# Patient Record
Sex: MALE | Race: WHITE | NOT HISPANIC OR LATINO | Employment: OTHER | URBAN - METROPOLITAN AREA
[De-identification: names, ages, dates, MRNs, and addresses within clinical notes are randomized per-mention and may not be internally consistent; named-entity substitution may affect disease eponyms.]

---

## 2018-10-08 ENCOUNTER — HOSPITAL ENCOUNTER (OUTPATIENT)
Dept: GENERAL RADIOLOGY | Facility: HOSPITAL | Age: 68
Discharge: HOME | End: 2018-10-08
Admitting: ORTHOPAEDIC SURGERY

## 2018-10-08 ENCOUNTER — APPOINTMENT (OUTPATIENT)
Dept: PREADMISSION TESTING | Facility: HOSPITAL | Age: 68
End: 2018-10-08

## 2018-10-08 LAB
ANION GAP SERPL CALCULATED.3IONS-SCNC: 5 MMOL/L (ref 3–11)
BUN BLD-MCNC: 16 MG/DL (ref 9–23)
BUN/CREAT SERPL: 14.7 (ref 7–25)
CALCIUM SPEC-SCNC: 9.1 MG/DL (ref 8.7–10.4)
CHLORIDE SERPL-SCNC: 108 MMOL/L (ref 99–109)
CO2 SERPL-SCNC: 32 MMOL/L (ref 20–31)
CREAT BLD-MCNC: 1.09 MG/DL (ref 0.6–1.3)
DEPRECATED RDW RBC AUTO: 43.7 FL (ref 37–54)
ERYTHROCYTE [DISTWIDTH] IN BLOOD BY AUTOMATED COUNT: 13.1 % (ref 11.3–14.5)
GFR SERPL CREATININE-BSD FRML MDRD: 68 ML/MIN/1.73
GLUCOSE BLD-MCNC: 64 MG/DL (ref 70–100)
HBA1C MFR BLD: 6.1 % (ref 4.8–5.6)
HCT VFR BLD AUTO: 40.1 % (ref 38.9–50.9)
HGB BLD-MCNC: 13.3 G/DL (ref 13.1–17.5)
MCH RBC QN AUTO: 30.4 PG (ref 27–31)
MCHC RBC AUTO-ENTMCNC: 33.2 G/DL (ref 32–36)
MCV RBC AUTO: 91.6 FL (ref 80–99)
PLATELET # BLD AUTO: 128 10*3/MM3 (ref 150–450)
PMV BLD AUTO: 10.5 FL (ref 6–12)
POTASSIUM BLD-SCNC: 4.1 MMOL/L (ref 3.5–5.5)
RBC # BLD AUTO: 4.38 10*6/MM3 (ref 4.2–5.76)
SODIUM BLD-SCNC: 145 MMOL/L (ref 132–146)
WBC NRBC COR # BLD: 4.39 10*3/MM3 (ref 3.5–10.8)

## 2018-10-08 PROCEDURE — 36415 COLL VENOUS BLD VENIPUNCTURE: CPT

## 2018-10-08 PROCEDURE — 83036 HEMOGLOBIN GLYCOSYLATED A1C: CPT | Performed by: ORTHOPAEDIC SURGERY

## 2018-10-08 PROCEDURE — 93005 ELECTROCARDIOGRAM TRACING: CPT

## 2018-10-08 PROCEDURE — 93010 ELECTROCARDIOGRAM REPORT: CPT | Performed by: INTERNAL MEDICINE

## 2018-10-08 PROCEDURE — 80048 BASIC METABOLIC PNL TOTAL CA: CPT | Performed by: ORTHOPAEDIC SURGERY

## 2018-10-08 PROCEDURE — 71046 X-RAY EXAM CHEST 2 VIEWS: CPT

## 2018-10-08 PROCEDURE — 85027 COMPLETE CBC AUTOMATED: CPT | Performed by: ORTHOPAEDIC SURGERY

## 2018-10-08 RX ORDER — MELOXICAM 15 MG/1
15 TABLET ORAL NIGHTLY
COMMUNITY

## 2018-10-08 RX ORDER — PANTOPRAZOLE SODIUM 20 MG/1
20 TABLET, DELAYED RELEASE ORAL NIGHTLY
COMMUNITY

## 2018-10-08 RX ORDER — ATORVASTATIN CALCIUM 10 MG/1
10 TABLET, FILM COATED ORAL NIGHTLY
COMMUNITY

## 2018-10-08 RX ORDER — CLOPIDOGREL BISULFATE 75 MG/1
75 TABLET ORAL NIGHTLY
COMMUNITY

## 2018-10-08 RX ORDER — UBIDECARENONE 100 MG
100 CAPSULE ORAL NIGHTLY
COMMUNITY

## 2018-10-08 RX ORDER — METOPROLOL SUCCINATE 50 MG/1
50 TABLET, EXTENDED RELEASE ORAL NIGHTLY
COMMUNITY

## 2018-10-08 NOTE — PAT
Home monitoring for pacemaker     Patient instructed to drink 20 ounces (or until full) of Gatorade or 20 ounces of G2 (if diabetic) and complete 3 hours before your surgery start time. (NO RED Gatorade or G2)    Patient verbalized understanding.    Patient to apply Chlorhexadine wipes  to surgical area (as instructed) the night before procedure and the AM of procedure. Wipes provided.

## 2018-10-08 NOTE — DISCHARGE INSTRUCTIONS

## 2018-10-14 ENCOUNTER — ANESTHESIA EVENT (OUTPATIENT)
Dept: PERIOP | Facility: HOSPITAL | Age: 68
End: 2018-10-14

## 2018-10-14 RX ORDER — FAMOTIDINE 10 MG/ML
20 INJECTION, SOLUTION INTRAVENOUS ONCE
Status: CANCELLED | OUTPATIENT
Start: 2018-10-14 | End: 2018-10-14

## 2018-10-15 ENCOUNTER — ANESTHESIA (OUTPATIENT)
Dept: PERIOP | Facility: HOSPITAL | Age: 68
End: 2018-10-15

## 2018-10-15 ENCOUNTER — HOSPITAL ENCOUNTER (INPATIENT)
Facility: HOSPITAL | Age: 68
LOS: 1 days | Discharge: HOME | End: 2018-10-16
Attending: ORTHOPAEDIC SURGERY | Admitting: ORTHOPAEDIC SURGERY

## 2018-10-15 ENCOUNTER — APPOINTMENT (OUTPATIENT)
Dept: GENERAL RADIOLOGY | Facility: HOSPITAL | Age: 68
End: 2018-10-15

## 2018-10-15 PROBLEM — R73.03 PREDIABETES: Status: ACTIVE | Noted: 2018-10-15

## 2018-10-15 PROBLEM — Z86.73 HISTORY OF STROKE: Status: ACTIVE | Noted: 2018-10-15

## 2018-10-15 PROBLEM — I10 HTN (HYPERTENSION): Status: ACTIVE | Noted: 2018-10-15

## 2018-10-15 PROBLEM — Z96.612 STATUS POST REVERSE TOTAL REPLACEMENT OF LEFT SHOULDER: Status: ACTIVE | Noted: 2018-10-15

## 2018-10-15 PROBLEM — E78.5 HLD (HYPERLIPIDEMIA): Status: ACTIVE | Noted: 2018-10-15

## 2018-10-15 PROBLEM — Z95.0 PACEMAKER: Status: ACTIVE | Noted: 2018-10-15

## 2018-10-15 LAB
GLUCOSE BLDC GLUCOMTR-MCNC: 122 MG/DL (ref 70–130)
GLUCOSE BLDC GLUCOMTR-MCNC: 153 MG/DL (ref 70–130)
POTASSIUM BLDA-SCNC: 3.55 MMOL/L (ref 3.5–5.3)

## 2018-10-15 PROCEDURE — 0LS40ZZ REPOSITION LEFT UPPER ARM TENDON, OPEN APPROACH: ICD-10-PCS | Performed by: ORTHOPAEDIC SURGERY

## 2018-10-15 PROCEDURE — 84132 ASSAY OF SERUM POTASSIUM: CPT | Performed by: ANESTHESIOLOGY

## 2018-10-15 PROCEDURE — 25010000002 PHENYLEPHRINE PER 1 ML: Performed by: NURSE ANESTHETIST, CERTIFIED REGISTERED

## 2018-10-15 PROCEDURE — A9270 NON-COVERED ITEM OR SERVICE: HCPCS | Performed by: NURSE PRACTITIONER

## 2018-10-15 PROCEDURE — G8987 SELF CARE CURRENT STATUS: HCPCS | Performed by: OCCUPATIONAL THERAPIST

## 2018-10-15 PROCEDURE — C1776 JOINT DEVICE (IMPLANTABLE): HCPCS | Performed by: ORTHOPAEDIC SURGERY

## 2018-10-15 PROCEDURE — 63710000001 SERTRALINE 50 MG TABLET: Performed by: NURSE PRACTITIONER

## 2018-10-15 PROCEDURE — 63710000001 ATORVASTATIN 10 MG TABLET: Performed by: NURSE PRACTITIONER

## 2018-10-15 PROCEDURE — 25010000002 DEXAMETHASONE PER 1 MG: Performed by: NURSE ANESTHETIST, CERTIFIED REGISTERED

## 2018-10-15 PROCEDURE — 25010000002 FENTANYL CITRATE (PF) 100 MCG/2ML SOLUTION: Performed by: NURSE ANESTHETIST, CERTIFIED REGISTERED

## 2018-10-15 PROCEDURE — 0RRK00Z REPLACEMENT OF LEFT SHOULDER JOINT WITH REVERSE BALL AND SOCKET SYNTHETIC SUBSTITUTE, OPEN APPROACH: ICD-10-PCS | Performed by: ORTHOPAEDIC SURGERY

## 2018-10-15 PROCEDURE — 25010000003 CEFAZOLIN IN DEXTROSE 2-4 GM/100ML-% SOLUTION: Performed by: ORTHOPAEDIC SURGERY

## 2018-10-15 PROCEDURE — 25010000002 VANCOMYCIN: Performed by: ORTHOPAEDIC SURGERY

## 2018-10-15 PROCEDURE — 97535 SELF CARE MNGMENT TRAINING: CPT | Performed by: OCCUPATIONAL THERAPIST

## 2018-10-15 PROCEDURE — 25010000002 ROPIVACAINE PER 1 MG: Performed by: NURSE ANESTHETIST, CERTIFIED REGISTERED

## 2018-10-15 PROCEDURE — 97110 THERAPEUTIC EXERCISES: CPT | Performed by: OCCUPATIONAL THERAPIST

## 2018-10-15 PROCEDURE — 25010000002 PROPOFOL 1000 MG/ML EMULSION: Performed by: NURSE ANESTHETIST, CERTIFIED REGISTERED

## 2018-10-15 PROCEDURE — 25010000002 ROPIVACINE HCL-NACL: Performed by: NURSE ANESTHETIST, CERTIFIED REGISTERED

## 2018-10-15 PROCEDURE — 63710000001 METOPROLOL SUCCINATE XL 50 MG TABLET SUSTAINED-RELEASE 24 HOUR: Performed by: NURSE PRACTITIONER

## 2018-10-15 PROCEDURE — 25010000002 NEOSTIGMINE PER 0.5 MG: Performed by: NURSE ANESTHETIST, CERTIFIED REGISTERED

## 2018-10-15 PROCEDURE — 97166 OT EVAL MOD COMPLEX 45 MIN: CPT | Performed by: OCCUPATIONAL THERAPIST

## 2018-10-15 PROCEDURE — 25010000002 VANCOMYCIN 10 G RECONSTITUTED SOLUTION: Performed by: ORTHOPAEDIC SURGERY

## 2018-10-15 PROCEDURE — G8988 SELF CARE GOAL STATUS: HCPCS | Performed by: OCCUPATIONAL THERAPIST

## 2018-10-15 PROCEDURE — 25010000002 ONDANSETRON PER 1 MG: Performed by: NURSE ANESTHETIST, CERTIFIED REGISTERED

## 2018-10-15 PROCEDURE — 82962 GLUCOSE BLOOD TEST: CPT

## 2018-10-15 PROCEDURE — 73030 X-RAY EXAM OF SHOULDER: CPT

## 2018-10-15 PROCEDURE — 25010000002 PROPOFOL 10 MG/ML EMULSION: Performed by: NURSE ANESTHETIST, CERTIFIED REGISTERED

## 2018-10-15 PROCEDURE — 63710000001 INSULIN LISPRO (HUMAN) PER 5 UNITS: Performed by: NURSE PRACTITIONER

## 2018-10-15 DEVICE — SCRW COMPR EQUINOXE LK 4.5X26MM: Type: IMPLANTABLE DEVICE | Site: SHOULDER | Status: FUNCTIONAL

## 2018-10-15 DEVICE — SCRW LK EQUINOXE GLENOSPHERE REV/SHLDR: Type: IMPLANTABLE DEVICE | Site: SHOULDER | Status: FUNCTIONAL

## 2018-10-15 DEVICE — SCRW EQUINOXE TORQ DEFINE REV SHLDR KT: Type: IMPLANTABLE DEVICE | Site: SHOULDER | Status: FUNCTIONAL

## 2018-10-15 DEVICE — IMPLANTABLE DEVICE: Type: IMPLANTABLE DEVICE | Site: SHOULDER | Status: FUNCTIONAL

## 2018-10-15 DEVICE — TOTL SHLDR AUG PLT ARTHROPLASTY UPCHRG: Type: IMPLANTABLE DEVICE | Site: SHOULDER | Status: FUNCTIONAL

## 2018-10-15 DEVICE — LINER HUM EQUINOXE REV SHLDR 38 PLS0: Type: IMPLANTABLE DEVICE | Site: SHOULDER | Status: FUNCTIONAL

## 2018-10-15 DEVICE — GLENOSPHERE SHLDR/REV EQUINOXE 38MM: Type: IMPLANTABLE DEVICE | Site: SHOULDER | Status: FUNCTIONAL

## 2018-10-15 DEVICE — TRY HUM EQUINOXE ADPT REV SHLDR PLS0: Type: IMPLANTABLE DEVICE | Site: SHOULDER | Status: FUNCTIONAL

## 2018-10-15 DEVICE — SCRW COMPR EQUINOXE LK 4.5X38MM: Type: IMPLANTABLE DEVICE | Site: SHOULDER | Status: FUNCTIONAL

## 2018-10-15 DEVICE — PLT GLEN JNT EQUINOXE AUG POST 8DEG LT: Type: IMPLANTABLE DEVICE | Site: SHOULDER | Status: FUNCTIONAL

## 2018-10-15 DEVICE — STEM HUM PRI EQUINOXE PRESSFIT 9MM: Type: IMPLANTABLE DEVICE | Site: SHOULDER | Status: FUNCTIONAL

## 2018-10-15 DEVICE — SCRW COMPR EQUINOXE LK 4.5X34MM: Type: IMPLANTABLE DEVICE | Site: SHOULDER | Status: FUNCTIONAL

## 2018-10-15 RX ORDER — ATORVASTATIN CALCIUM 10 MG/1
10 TABLET, FILM COATED ORAL NIGHTLY
Status: DISCONTINUED | OUTPATIENT
Start: 2018-10-15 | End: 2018-10-16 | Stop reason: HOSPADM

## 2018-10-15 RX ORDER — NALOXONE HCL 0.4 MG/ML
0.1 VIAL (ML) INJECTION
Status: DISCONTINUED | OUTPATIENT
Start: 2018-10-15 | End: 2018-10-16 | Stop reason: HOSPADM

## 2018-10-15 RX ORDER — FAMOTIDINE 20 MG/1
20 TABLET, FILM COATED ORAL ONCE
Status: COMPLETED | OUTPATIENT
Start: 2018-10-15 | End: 2018-10-15

## 2018-10-15 RX ORDER — PROPOFOL 10 MG/ML
VIAL (ML) INTRAVENOUS AS NEEDED
Status: DISCONTINUED | OUTPATIENT
Start: 2018-10-15 | End: 2018-10-15 | Stop reason: SURG

## 2018-10-15 RX ORDER — LIDOCAINE HYDROCHLORIDE 10 MG/ML
0.5 INJECTION, SOLUTION EPIDURAL; INFILTRATION; INTRACAUDAL; PERINEURAL ONCE AS NEEDED
Status: COMPLETED | OUTPATIENT
Start: 2018-10-15 | End: 2018-10-15

## 2018-10-15 RX ORDER — ONDANSETRON 2 MG/ML
INJECTION INTRAMUSCULAR; INTRAVENOUS AS NEEDED
Status: DISCONTINUED | OUTPATIENT
Start: 2018-10-15 | End: 2018-10-15 | Stop reason: SURG

## 2018-10-15 RX ORDER — SODIUM CHLORIDE, SODIUM LACTATE, POTASSIUM CHLORIDE, CALCIUM CHLORIDE 600; 310; 30; 20 MG/100ML; MG/100ML; MG/100ML; MG/100ML
9 INJECTION, SOLUTION INTRAVENOUS CONTINUOUS
Status: DISCONTINUED | OUTPATIENT
Start: 2018-10-15 | End: 2018-10-16 | Stop reason: HOSPADM

## 2018-10-15 RX ORDER — ONDANSETRON 2 MG/ML
4 INJECTION INTRAMUSCULAR; INTRAVENOUS EVERY 6 HOURS PRN
Status: DISCONTINUED | OUTPATIENT
Start: 2018-10-15 | End: 2018-10-16 | Stop reason: HOSPADM

## 2018-10-15 RX ORDER — CEFAZOLIN SODIUM 2 G/100ML
2 INJECTION, SOLUTION INTRAVENOUS ONCE
Status: COMPLETED | OUTPATIENT
Start: 2018-10-15 | End: 2018-10-15

## 2018-10-15 RX ORDER — DEXAMETHASONE SODIUM PHOSPHATE 4 MG/ML
INJECTION, SOLUTION INTRA-ARTICULAR; INTRALESIONAL; INTRAMUSCULAR; INTRAVENOUS; SOFT TISSUE AS NEEDED
Status: DISCONTINUED | OUTPATIENT
Start: 2018-10-15 | End: 2018-10-15 | Stop reason: SURG

## 2018-10-15 RX ORDER — ROCURONIUM BROMIDE 10 MG/ML
INJECTION, SOLUTION INTRAVENOUS AS NEEDED
Status: DISCONTINUED | OUTPATIENT
Start: 2018-10-15 | End: 2018-10-15 | Stop reason: SURG

## 2018-10-15 RX ORDER — DOCUSATE SODIUM 100 MG/1
100 CAPSULE, LIQUID FILLED ORAL 2 TIMES DAILY PRN
Status: DISCONTINUED | OUTPATIENT
Start: 2018-10-15 | End: 2018-10-16 | Stop reason: HOSPADM

## 2018-10-15 RX ORDER — SODIUM CHLORIDE 0.9 % (FLUSH) 0.9 %
3 SYRINGE (ML) INJECTION EVERY 12 HOURS SCHEDULED
Status: DISCONTINUED | OUTPATIENT
Start: 2018-10-15 | End: 2018-10-15 | Stop reason: HOSPADM

## 2018-10-15 RX ORDER — FENTANYL CITRATE 50 UG/ML
50 INJECTION, SOLUTION INTRAMUSCULAR; INTRAVENOUS
Status: DISCONTINUED | OUTPATIENT
Start: 2018-10-15 | End: 2018-10-15 | Stop reason: HOSPADM

## 2018-10-15 RX ORDER — LIDOCAINE HYDROCHLORIDE 10 MG/ML
INJECTION, SOLUTION INFILTRATION; PERINEURAL AS NEEDED
Status: DISCONTINUED | OUTPATIENT
Start: 2018-10-15 | End: 2018-10-15 | Stop reason: SURG

## 2018-10-15 RX ORDER — FENTANYL CITRATE 50 UG/ML
INJECTION, SOLUTION INTRAMUSCULAR; INTRAVENOUS AS NEEDED
Status: DISCONTINUED | OUTPATIENT
Start: 2018-10-15 | End: 2018-10-15 | Stop reason: SURG

## 2018-10-15 RX ORDER — LABETALOL HYDROCHLORIDE 5 MG/ML
10 INJECTION, SOLUTION INTRAVENOUS EVERY 4 HOURS PRN
Status: DISCONTINUED | OUTPATIENT
Start: 2018-10-15 | End: 2018-10-16 | Stop reason: HOSPADM

## 2018-10-15 RX ORDER — BISACODYL 5 MG/1
10 TABLET, DELAYED RELEASE ORAL DAILY PRN
Status: DISCONTINUED | OUTPATIENT
Start: 2018-10-15 | End: 2018-10-16 | Stop reason: HOSPADM

## 2018-10-15 RX ORDER — GLYCOPYRROLATE 0.2 MG/ML
INJECTION INTRAMUSCULAR; INTRAVENOUS AS NEEDED
Status: DISCONTINUED | OUTPATIENT
Start: 2018-10-15 | End: 2018-10-15 | Stop reason: SURG

## 2018-10-15 RX ORDER — HYDROCODONE BITARTRATE AND ACETAMINOPHEN 10; 325 MG/1; MG/1
1 TABLET ORAL EVERY 4 HOURS PRN
Status: DISCONTINUED | OUTPATIENT
Start: 2018-10-15 | End: 2018-10-16 | Stop reason: HOSPADM

## 2018-10-15 RX ORDER — BUPIVACAINE HYDROCHLORIDE 2.5 MG/ML
INJECTION, SOLUTION EPIDURAL; INFILTRATION; INTRACAUDAL AS NEEDED
Status: DISCONTINUED | OUTPATIENT
Start: 2018-10-15 | End: 2018-10-15 | Stop reason: SURG

## 2018-10-15 RX ORDER — HYDROMORPHONE HYDROCHLORIDE 1 MG/ML
0.5 INJECTION, SOLUTION INTRAMUSCULAR; INTRAVENOUS; SUBCUTANEOUS
Status: DISCONTINUED | OUTPATIENT
Start: 2018-10-15 | End: 2018-10-16 | Stop reason: HOSPADM

## 2018-10-15 RX ORDER — METOPROLOL SUCCINATE 50 MG/1
50 TABLET, EXTENDED RELEASE ORAL NIGHTLY
Status: DISCONTINUED | OUTPATIENT
Start: 2018-10-15 | End: 2018-10-16 | Stop reason: HOSPADM

## 2018-10-15 RX ORDER — DEXTROSE MONOHYDRATE 25 G/50ML
25 INJECTION, SOLUTION INTRAVENOUS
Status: DISCONTINUED | OUTPATIENT
Start: 2018-10-15 | End: 2018-10-16 | Stop reason: HOSPADM

## 2018-10-15 RX ORDER — ONDANSETRON 2 MG/ML
4 INJECTION INTRAMUSCULAR; INTRAVENOUS ONCE AS NEEDED
Status: DISCONTINUED | OUTPATIENT
Start: 2018-10-15 | End: 2018-10-15 | Stop reason: HOSPADM

## 2018-10-15 RX ORDER — NICOTINE POLACRILEX 4 MG
15 LOZENGE BUCCAL
Status: DISCONTINUED | OUTPATIENT
Start: 2018-10-15 | End: 2018-10-16 | Stop reason: HOSPADM

## 2018-10-15 RX ORDER — LIDOCAINE HYDROCHLORIDE 10 MG/ML
INJECTION, SOLUTION EPIDURAL; INFILTRATION; INTRACAUDAL; PERINEURAL AS NEEDED
Status: DISCONTINUED | OUTPATIENT
Start: 2018-10-15 | End: 2018-10-15 | Stop reason: SURG

## 2018-10-15 RX ORDER — SODIUM CHLORIDE 0.9 % (FLUSH) 0.9 %
3-10 SYRINGE (ML) INJECTION AS NEEDED
Status: DISCONTINUED | OUTPATIENT
Start: 2018-10-15 | End: 2018-10-15 | Stop reason: HOSPADM

## 2018-10-15 RX ORDER — HYDROMORPHONE HYDROCHLORIDE 1 MG/ML
0.5 INJECTION, SOLUTION INTRAMUSCULAR; INTRAVENOUS; SUBCUTANEOUS
Status: DISCONTINUED | OUTPATIENT
Start: 2018-10-15 | End: 2018-10-15 | Stop reason: HOSPADM

## 2018-10-15 RX ORDER — SODIUM CHLORIDE 450 MG/100ML
50 INJECTION, SOLUTION INTRAVENOUS CONTINUOUS
Status: DISCONTINUED | OUTPATIENT
Start: 2018-10-15 | End: 2018-10-16 | Stop reason: HOSPADM

## 2018-10-15 RX ORDER — PANTOPRAZOLE SODIUM 20 MG/1
20 TABLET, DELAYED RELEASE ORAL NIGHTLY
Status: DISCONTINUED | OUTPATIENT
Start: 2018-10-15 | End: 2018-10-16 | Stop reason: HOSPADM

## 2018-10-15 RX ADMIN — CEFAZOLIN SODIUM 2 G: 2 INJECTION, SOLUTION INTRAVENOUS at 09:44

## 2018-10-15 RX ADMIN — SERTRALINE HYDROCHLORIDE 50 MG: 50 TABLET ORAL at 20:52

## 2018-10-15 RX ADMIN — ROPIVACAINE HYDROCHLORIDE 6 ML/HR: 5 INJECTION, SOLUTION EPIDURAL; INFILTRATION; PERINEURAL at 12:15

## 2018-10-15 RX ADMIN — PROPOFOL 155 MCG/KG/MIN: 10 INJECTION, EMULSION INTRAVENOUS at 10:15

## 2018-10-15 RX ADMIN — VANCOMYCIN HYDROCHLORIDE 1250 MG: 10 INJECTION, POWDER, LYOPHILIZED, FOR SOLUTION INTRAVENOUS at 20:52

## 2018-10-15 RX ADMIN — PHENYLEPHRINE HYDROCHLORIDE 80 MCG: 10 INJECTION INTRAVENOUS at 10:48

## 2018-10-15 RX ADMIN — DEXAMETHASONE SODIUM PHOSPHATE 8 MG: 4 INJECTION, SOLUTION INTRAMUSCULAR; INTRAVENOUS at 10:25

## 2018-10-15 RX ADMIN — PROPOFOL 150 MG: 10 INJECTION, EMULSION INTRAVENOUS at 10:08

## 2018-10-15 RX ADMIN — ROCURONIUM BROMIDE 50 MG: 10 SOLUTION INTRAVENOUS at 10:08

## 2018-10-15 RX ADMIN — VANCOMYCIN HYDROCHLORIDE 1250 MG: 10 INJECTION, POWDER, LYOPHILIZED, FOR SOLUTION INTRAVENOUS at 10:05

## 2018-10-15 RX ADMIN — FENTANYL CITRATE 100 MCG: 50 INJECTION, SOLUTION INTRAMUSCULAR; INTRAVENOUS at 09:26

## 2018-10-15 RX ADMIN — FENTANYL CITRATE 50 MCG: 50 INJECTION, SOLUTION INTRAMUSCULAR; INTRAVENOUS at 13:15

## 2018-10-15 RX ADMIN — PHENYLEPHRINE HYDROCHLORIDE 80 MCG: 10 INJECTION INTRAVENOUS at 11:47

## 2018-10-15 RX ADMIN — PHENYLEPHRINE HYDROCHLORIDE 80 MCG: 10 INJECTION INTRAVENOUS at 10:32

## 2018-10-15 RX ADMIN — BUPIVACAINE HYDROCHLORIDE 15 ML: 2.5 INJECTION, SOLUTION EPIDURAL; INFILTRATION; INTRACAUDAL; PERINEURAL at 09:30

## 2018-10-15 RX ADMIN — VANCOMYCIN HYDROCHLORIDE 1250 MG: 10 INJECTION, POWDER, LYOPHILIZED, FOR SOLUTION INTRAVENOUS at 10:19

## 2018-10-15 RX ADMIN — SODIUM CHLORIDE 50 ML/HR: 4.5 INJECTION, SOLUTION INTRAVENOUS at 15:00

## 2018-10-15 RX ADMIN — LIDOCAINE HYDROCHLORIDE 30 MG: 10 INJECTION, SOLUTION EPIDURAL; INFILTRATION; INTRACAUDAL; PERINEURAL at 09:27

## 2018-10-15 RX ADMIN — PHENYLEPHRINE HYDROCHLORIDE 80 MCG: 10 INJECTION INTRAVENOUS at 11:26

## 2018-10-15 RX ADMIN — ROPIVACAINE HYDROCHLORIDE 6 ML/HR: 5 INJECTION, SOLUTION EPIDURAL; INFILTRATION; PERINEURAL at 12:41

## 2018-10-15 RX ADMIN — SODIUM CHLORIDE, POTASSIUM CHLORIDE, SODIUM LACTATE AND CALCIUM CHLORIDE 9 ML/HR: 600; 310; 30; 20 INJECTION, SOLUTION INTRAVENOUS at 08:14

## 2018-10-15 RX ADMIN — SODIUM CHLORIDE, POTASSIUM CHLORIDE, SODIUM LACTATE AND CALCIUM CHLORIDE: 600; 310; 30; 20 INJECTION, SOLUTION INTRAVENOUS at 11:27

## 2018-10-15 RX ADMIN — FAMOTIDINE 20 MG: 20 TABLET ORAL at 08:13

## 2018-10-15 RX ADMIN — GLYCOPYRROLATE 0.4 MG: 0.2 INJECTION, SOLUTION INTRAMUSCULAR; INTRAVENOUS at 12:20

## 2018-10-15 RX ADMIN — METOPROLOL SUCCINATE 50 MG: 50 TABLET, EXTENDED RELEASE ORAL at 20:52

## 2018-10-15 RX ADMIN — LIDOCAINE HYDROCHLORIDE 0.3 ML: 10 INJECTION, SOLUTION EPIDURAL; INFILTRATION; INTRACAUDAL; PERINEURAL at 08:13

## 2018-10-15 RX ADMIN — LIDOCAINE HYDROCHLORIDE 40 MG: 10 INJECTION, SOLUTION INFILTRATION; PERINEURAL at 10:08

## 2018-10-15 RX ADMIN — Medication 2 MG: at 12:20

## 2018-10-15 RX ADMIN — ONDANSETRON 4 MG: 2 INJECTION INTRAMUSCULAR; INTRAVENOUS at 12:20

## 2018-10-15 RX ADMIN — ATORVASTATIN CALCIUM 10 MG: 10 TABLET, FILM COATED ORAL at 20:52

## 2018-10-15 NOTE — ANESTHESIA PROCEDURE NOTES
Airway  Urgency: elective    Airway not difficult    General Information and Staff    Patient location during procedure: OR  CRNA: ZOË MEDRANO    Indications and Patient Condition  Indications for airway management: airway protection    Preoxygenated: yes  MILS not maintained throughout  Mask difficulty assessment: 1 - vent by mask    Final Airway Details  Final airway type: endotracheal airway      Successful airway: ETT  Cuffed: yes   Successful intubation technique: direct laryngoscopy  Endotracheal tube insertion site: oral  Blade: [place]  Blade size: 2  ETT size: 7.5 mm  Cormack-Lehane Classification: grade I - full view of glottis  Placement verified by: chest auscultation and capnometry   Measured from: lips  ETT to lips (cm): 23  Number of attempts at approach: 1    Additional Comments  Negative epigastric sounds, Breath sound equal bilaterally with symmetric chest rise and fall

## 2018-10-15 NOTE — ANESTHESIA POSTPROCEDURE EVALUATION
Patient: Levi Cain    Procedure Summary     Date:  10/15/18 Room / Location:   [facility] /  [facility]    Anesthesia Start:  1008 Anesthesia Stop:  1244    Procedure:  LEFT REVERSE TOTAL SHOULDER ARTHROPLASTY (Left Shoulder) Diagnosis:       Primary localized osteoarthrosis of shoulder, left      Bicipital tendinitis of left shoulder    Surgeon:  Lloyd Amador MD Provider:  Gerald Velazquez MD    Anesthesia Type:  general, regional ASA Status:  3          Anesthesia Type: general, regional  Last vitals  BP   131/70 (10/15/18 1240)   Temp   98 °F (36.7 °C) (10/15/18 1240)   Pulse   69 (10/15/18 1240)   Resp   16 (10/15/18 1240)     SpO2   95 % (10/15/18 1240)     Post Anesthesia Care and Evaluation    Patient location during evaluation: PACU  Patient participation: waiting for patient participation  Level of consciousness: awake  Pain score: 0  Pain management: adequate  Airway patency: patent  Anesthetic complications: No anesthetic complications  PONV Status: none  Cardiovascular status: hemodynamically stable and acceptable  Respiratory status: nonlabored ventilation, acceptable, nasal cannula and oral airway  Hydration status: acceptable

## 2018-10-15 NOTE — H&P
Patient Name: Levi Cain  MRN: [id number]  : 1950  DOS: 10/15/2018    Attending: Lloyd Amador MD    Primary Care Provider: John Meyer MD      Chief complaint:  Left shoulder pain    Subjective   Patient is a 68 y.o. male presented for Left reverse total shoulder arthroplasty by Dr. Amador under [place]. He tolerated surgery well and is admitted for further medical management. His shoulder has been painful with limited ROM for several years. He has had previous right shoulder replacement.    When seen postop he is sleeping soundly; wife and son at bedside. He appears comfortable.     He has history of CVA. He takes Plavix daily, LD Monday.    ( above is noted/agree. Still asleep when I visited. Discussed with his wife and son , he has the same response to sedation and anesthesia, with prolonged effects of sedation. No n/vom/sob. Comfortable. He was awake with increased pain, block infusion rate was increased. .)wy       Allergies:  Allergies   Allergen Reactions   • Flexeril [Cyclobenzaprine] Shortness Of Breath   • Nyquil Multi-Symptom [Pseudoeph-Doxylamine-Dm-Apap] Other (See Comments)     Lost consciousness        Meds:  Prescriptions Prior to Admission   Medication Sig Dispense Refill Last Dose   • atorvastatin (LIPITOR) 10 MG tablet Take 10 mg by mouth Every Night.   10/14/2018 at 0   • coenzyme Q10 100 MG capsule Take 100 mg by mouth Every Night.   10/14/2018 at 0   • metoprolol succinate XL (TOPROL-XL) 50 MG 24 hr tablet Take 50 mg by mouth Every Night.   10/14/2018 at    • pantoprazole (PROTONIX) 20 MG EC tablet Take 20 mg by mouth Every Night.   10/14/2018 at    • sertraline (ZOLOFT) 50 MG tablet Take 50 mg by mouth Every Night.   10/14/2018 at    • clopidogrel (PLAVIX) 75 MG tablet Take 75 mg by mouth Every Night. Will stop for surgery per Dr Amador's office   10/7/2018   • meloxicam (MOBIC) 15 MG tablet Take 15 mg by mouth Every Night. Stopped for surgery   10/4/2018  "  • Multiple Vitamins-Minerals (MULTIVITAMIN ADULT PO) Take 1 tablet by mouth Every Night.   10/13/2018   • vitamin E 100 UNIT capsule Take 100 Units by mouth Every Night.   10/7/2018       History:   Past Medical History:   Diagnosis Date   • Arthritis     left shoulder, knees, multi joint    • Cancer (CMS/HCC)     prostate cancer-monitoring levels currently-no current treatment    • Hearing loss    • History of CVA (cerebrovascular accident)    • History of depression    • History of kidney stones     passed   • History of transfusion 1953    as a baby   • Hypertension    • Mitral regurgitation    • Pacemaker     bradycardia/tachycardia    • Peripheral vision loss     right side   • Pott's disease    • Shortness of breath    • Shoulder pain    • Slow to wake up after anesthesia     per pt    • Wears dentures     full   • Wears glasses    • Wears hearing aid in both ears      Past Surgical History:   Procedure Laterality Date   • COLONOSCOPY     • EYE SURGERY Right     lens implant-due to injury from a piece of metal    • HERNIA REPAIR      inguinal hernia as well as umbilical    • REPLACEMENT TOTAL KNEE Left    • TOTAL SHOULDER REPLACEMENT Right    • TOTAL SHOULDER REVERSE ARTHROPLASTY Right      History reviewed. No pertinent family history.  Social History   Substance Use Topics   • Smoking status: Never Smoker   • Smokeless tobacco: Never Used   • Alcohol use No   He is  with 2 children. He is a retired .    Review of Systems  Review of systems could not be obtained due to   patient sedation status.    Vital Signs  /74 (BP Location: Right arm, Patient Position: Lying)   Pulse 69   Temp 96.3 °F (35.7 °C) (Axillary)   Resp 16   Ht 175.3 cm (69\")   Wt 85.3 kg (188 lb)   SpO2 98%   BMI 27.76 kg/m²     Physical Exam:    General Appearance:    Sleeping, in no acute distress   Head:    Normocephalic, without obvious abnormality, atraumatic   Ears:    Ears appear intact with no abnormalities " noted   Neck:   No adenopathy, supple, trachea midline, no thyromegaly    Lungs:     Clear to auscultation,respirations regular, even and                   unlabored    Heart:    Regular rhythm and normal rate, normal S1 and S2, no            murmur, no gallop, no rub    Abdomen:     Normal bowel sounds, no masses, no organomegaly, soft        non-tender, non-distended, no guarding, no rebound                 tenderness   Genitalia:    Deferred   Extremities:   LUE sling, nerve block present. Aquacel CDI. Good cap refill left digits.   Pulses:   Pulses palpable and equal bilaterally   Skin:   No bleeding, bruising or rash      I reviewed the patient's new clinical results.     Results for SALOME WILLS (MRN [id number]) as of 10/15/2018 15:08   Ref. Range 10/8/2018 14:09   Glucose Latest Ref Range: 70 - 100 mg/dL 64 (L)   Sodium Latest Ref Range: 132 - 146 mmol/L 145   Potassium Latest Ref Range: 3.5 - 5.5 mmol/L 4.1   CO2 Latest Ref Range: 20.0 - 31.0 mmol/L 32.0 (H)   Chloride Latest Ref Range: 99 - 109 mmol/L 108   Anion Gap Latest Ref Range: 3.0 - 11.0 mmol/L 5.0   Creatinine Latest Ref Range: 0.60 - 1.30 mg/dL 1.09   BUN Latest Ref Range: 9 - 23 mg/dL 16   BUN/Creatinine Ratio Latest Ref Range: 7.0 - 25.0  14.7   Calcium Latest Ref Range: 8.7 - 10.4 mg/dL 9.1   eGFR Non African Amer Latest Ref Range: >60 mL/min/1.73 68   Hemoglobin A1C Latest Ref Range: 4.80 - 5.60 % 6.10 (H)   WBC Latest Ref Range: 3.50 - 10.80 10*3/mm3 4.39   RBC Latest Ref Range: 4.20 - 5.76 10*6/mm3 4.38   Hemoglobin Latest Ref Range: 13.1 - 17.5 g/dL 13.3   Hematocrit Latest Ref Range: 38.9 - 50.9 % 40.1   RDW Latest Ref Range: 11.3 - 14.5 % 13.1   MCV Latest Ref Range: 80.0 - 99.0 fL 91.6   MCH Latest Ref Range: 27.0 - 31.0 pg 30.4   MCHC Latest Ref Range: 32.0 - 36.0 g/dL 33.2   MPV Latest Ref Range: 6.0 - 12.0 fL 10.5   Platelets Latest Ref Range: 150 - 450 10*3/mm3 128 (L)     Assessment and Plan:     Status post reverse total  replacement of left shoulder    HLD (hyperlipidemia)    HTN (hypertension)    Pacemaker    History of stroke    Prediabetes      Plan  1. PT/OT- LUE sling, pendulum exercises  2. Pain control-prns, interscalene nerve block  3. IS-encourage  4. DVT proph- [facility]  5. Bowel regimen  6. Resume home medications as appropriate  7. Monitor post-op labs  8. DC planning for home, likely tomorrow    HTN, HLD  - Continue home toprol and statin  - Monitor BP   - Holding parameters for BP meds  - Labetalol PRN for SBP>170    HO strokes  - Resume plavix when ok with Dr. Amador    PreDM  - hgb A1c on 10/8/18 6.1  - Accuchecks ACHS with low dose SSI    I have personally performed the evaluation on this patient. My history is consistent  with HPI obtained. My exam findings are listed above. I have personally reviewed and discussed the above formulated treatment plan with with family and with EMILY Moreno  10/15/18  3:32 PM

## 2018-10-15 NOTE — BRIEF OP NOTE
TOTAL SHOULDER REVERSE ARTHROPLASTY  Progress Note    Levi Cain  10/15/2018    Pre-op Diagnosis:   Primary localized osteoarthrosis of shoulder, left [M19.012]       Post-Op Diagnosis Codes:     * Primary localized osteoarthrosis of shoulder, left [M19.012]     * Bicipital tendinitis of left shoulder [M75.22]    Procedure/CPT® Codes:  CT RECONSTR TOTAL SHOULDER IMPLANT [99798]  CT REPAIR BICEPS LONG TENDON [58078]    Procedure(s):  LEFT REVERSE TOTAL SHOULDER ARTHROPLASTY    Surgeon(s):  Kwaku Jc MD Sajadi, MD Vilma Lopez Matt, MD, PGY-6 Sports Fellow    Anesthesia: General with Block    Staff:   Circulator: Magdi Rodriguez RN; Gracie Ng RN  Scrub Person: Pramod Asher Desiree N  Vendor Representative: Barry Jensen Jenna  Nursing Assistant: Helen Danielson    Estimated Blood Loss: 150 ml    Urine Voided: * No values recorded between 10/15/2018 10:08 AM and 10/15/2018 12:19 PM *    Specimens:                None      Drains:      Findings: per dictation    Complications: none      Lloyd Amador MD     Date: 10/15/2018  Time: 12:19 PM

## 2018-10-15 NOTE — H&P
Pre-Op H&P  Levi Cain  [id number]  1950    Chief complaint: Left shoulder pain    HPI:    Patient is a 68 y.o.male who presents with left shoulder pain and found to have left shoulder osteoarthritis and here today for left total shoulder arthoplasty possible reverse.     Review of Systems:  General ROS: negative for chills, fever or skin lesions;  No changes since last office visit  Cardiovascular ROS: no chest pain or dyspnea on exertion.  History of POTS syndrome s/p PPM and Follows with Dr. BRYAN kwong [place] with last visit 2 months ago and no new cardiac symptoms  Respiratory ROS: no cough, shortness of breath, or wheezing    Allergies:   Allergies   Allergen Reactions   • Flexeril [Cyclobenzaprine] Shortness Of Breath   • Nyquil Multi-Symptom [Pseudoeph-Doxylamine-Dm-Apap] Other (See Comments)     Lost consciousness        Home Meds:    No current facility-administered medications on file prior to encounter.      No current outpatient prescriptions on file prior to encounter.       PMH:   Past Medical History:   Diagnosis Date   • Arthritis     left shoulder, knees, multi joint    • Cancer (CMS/[facility])     prostate cancer-monitoring levels currently-no current treatment    • Hearing loss    • History of CVA (cerebrovascular accident)    • History of depression    • History of kidney stones     passed   • History of transfusion 1953    as a baby   • Hypertension    • Mitral regurgitation    • Pacemaker     bradycardia/tachycardia    • Peripheral vision loss     right side   • Pott's disease    • Shortness of breath    • Shoulder pain    • Slow to wake up after anesthesia     per pt    • Wears dentures     full   • Wears glasses    • Wears hearing aid in both ears      PSH:    Past Surgical History:   Procedure Laterality Date   • COLONOSCOPY     • EYE SURGERY Right     lens implant-due to injury from a piece of metal    • HERNIA REPAIR      inguinal hernia as well as umbilical    • REPLACEMENT TOTAL KNEE  Left    • TOTAL SHOULDER REPLACEMENT Right    • TOTAL SHOULDER REVERSE ARTHROPLASTY Right      Social History:   Tobacco:   History   Smoking Status   • Never Smoker   Smokeless Tobacco   • Never Used      Alcohol:     History   Alcohol Use No       Vitals:  Afebrile HR 70 O2 99% Bp 163/103  Physical Exam:  General Appearance:    Alert, cooperative, no distress, appears stated age   Head:    Normocephalic, without obvious abnormality, atraumatic   Lungs:     Clear to auscultation bilaterally, respirations unlabored    Heart:   Regular rate and rhythm, S1 and S2 normal, no murmur, rub    or gallop    Abdomen:    Soft, non-tender.  +bowel sounds   Breast Exam:    deferred   Genitalia:    deferred   Extremities:   Extremities normal, atraumatic, no cyanosis or edema   Skin:   Skin color, texture, turgor normal, no rashes or lesions   Neurologic:   Grossly intact   Results Review  I reviewed the patient's new clinical results.    Cancer Staging (if applicable)  Cancer Patient: __ yes _x_no __unknown; If yes, clinical stage T:__ N:__M:__, stage group or __N/A    Impression: Left shoulder osteoarthritis    Plan: Left total shoulder arthroplasty possible reverse    Nanci Silva, APRN   10/15/2018   8:14 AM

## 2018-10-15 NOTE — OP NOTE
DATE OF OPERATION: 10/15/18  PREOPERATIVE DIAGNOSIS: Primary localized osteoarthrosis of shoulder, left [M19.012]   POSTOPERATIVE DIAGNOSES:  1. Primary localized osteoarthrosis of shoulder, left [M19.012]  2. Biceps tenosynovitis.    PROCEDURES PERFORMED:  1. Left reverse total shoulder arthroplasty.    2. Left biceps tenodesis.    SURGEON: Lloyd Amador MD  ASSISTANTS:  1. Imtiaz Esparza MD, PGY-6 Sports Fellow  2. Kwaku Jc MD, PGY-5  ANESTHESIA: General plus block.    ESTIMATED BLOOD LOSS:150mL.    COMPLICATIONS: None.    DISPOSITION: Recovery room in stable condition.    IMPLANTS: [employer] Equinoxe reverse total shoulder system, 11 mm stem press-fit, 0 metal liner tray, 38 x 0 polyethylene tray, posterior augmented baseplate with 4 screws with locking caps, and a 38mm glenosphere.    INDICATIONS: This is a 68-year-old male with left shoulder pain and limited function and motion secondary to arthropathy. They have failed conservative treatment and after a discussion of risks, benefits, and alternatives, wished to proceed with shoulder arthroplasty. He previously had a successful reverse shoulder replacement on his other side after multiple surgeries. Given this, the plan was to perform a reverse arthroplasty.  DESCRIPTION OF PROCEDURE: On the day of surgery, the patient identified the left shoulder as the correct operative extremity. This was initialed by the surgeon with the patients's acknowledgment. The patient underwent placement of an interscalene block and was taken to the operating room and placed in the supine position. Upon induction of adequate anesthesia, the patient was brought up to the beach chair position and the shoulder and upper extremity were prepped and draped in the usual sterile fashion. Timeout confirmed the correct patient and operative extremity as well as that antibiotics were on board. A standard deltopectoral approach to the shoulder was carried out. It was carried sharply  through the skin and subcutaneous tissue. Medial and lateral flaps were developed over the deltopectoral fascia. Pacemaker guide wires were identified over the pectoralis extending along the deltopectoral interval and were protected and carefully retracted medially. The cephalic vein was identified and mobilized laterally with the deltoid. The subdeltoid and subpectoral spaces were mobilized and a blunt retractor was placed deep to this. The clavipectoral fascia was opened on the lateral edge of the conjoined tendon and the retractor was moved deep to this. The leading edge of the pectoralis was released exposing the long head of the biceps. This was tenosynovitic. It was tenodesed to the pectoralis and released proximal to this. The 3 sisters were identified and coagulated. A subscapularis tenotomy was performed 1 cm medial to the lesser tuberosity and rotator interval was released to the glenoid exposing the humeral head. The inferior capsule was released directly off the humerus to allow greater than 90° of external rotation. The anatomic neck was exposed and the humeral head osteotomy was performed in approximately 20° of retroversion. The remainder of the osteophytes were removed. The canal was then entered, reamed, and broached. The final stem impacted in in approximately 20° of retroversion. A head protector was placed. The humerus was subluxed posteriorly. The glenoid exposed. Circumferential labral excision and capsular release were performed. A 270° mobilization of the subscapularis was carried out as well.  A centering hole was drilled. The glenoid was gently reamed and the guide tip from the reamer broke in the glenoid. Drill holes had to be made to remove it. Then the large central hole for the baseplate was drilled and the cage glenoid baseplate impacted in.  Next, the inferior screw hole was drilled and a screw placed with excellent purchase.  Next, an riley-inferior screw was placed, then a  postero-inferior screw, then an riley-superior screw placed with acceptable purchase in all.  Locking caps were placed. The glenosphere was then inserted and locked into place with a set screw.  The humerus was carefully subluxed back anteriorly. A liner tray and polyethylene were placed and trialing was carried out. The appropriate final sizes were chosen and locked into place.  The shoulder was then reduced.  This allowed nearly full passive range of motion with no instability. The joint was copiously irrigated with orthopedic irrigation mixed with Betadine after the final implants were assembled and locked into place.  Passive range range of motion will be full elevation but external rotation will be limited to 30° in the perioperative period. The deltopectoral interval was approximated with 0 Vicryl, the subcutaneous tissue with 2-0 Vicryl, and the skin with Monocryl and Dermabond. A sterile dressing was placed. Anesthesia was reversed and the patient was taken to the recovery room in stable condition. All instrument, needle, and sponge counts were correct.      Lloyd Amador MD*

## 2018-10-15 NOTE — ANESTHESIA PROCEDURE NOTES
Interscalene block       Patient location during procedure: pre-op  Reason for block: at surgeon's request and post-op pain management  Performed by  Anesthesiologist: JIMMY MCLAIN  Assisted by: ZOË MEDRANO  Preanesthetic Checklist  Completed: patient identified, site marked, surgical consent, pre-op evaluation, timeout performed, IV checked, risks and benefits discussed and monitors and equipment checked  Prep:  Sterile barriers:cap, gloves, mask and sterile barriers  Prep: ChloraPrep  Patient monitoring: blood pressure monitoring, continuous pulse oximetry and EKG  Procedure  Sedation:yes    Guidance:ultrasound guided  Images:still images obtained    Block Type:interscalene  Injection Technique:catheter  Needle Type:Tuohy and echogenic  Needle Gauge:18 G    Catheter Size:20 G (20g)  Cath Depth at skin: 10 cm  Medications  Local Injected:bupivacaine 0.25% Local Amount Injected:15mL  Post Assessment  Injection Assessment: negative aspiration for heme, no paresthesia on injection and incremental injection  Patient Tolerance:comfortable throughout block  Complications:no  Additional Notes  Procedure:                 The pt was placed in semifowlers position with a slight tilt of the thorax contralateral to the insertion site.  The Insertion Site was prepped and draped in sterile fashion.  The pt was anesthetized with  IV Sedation( see meds) and  Skin and cutaneous tissue was infiltrated and anesthetized with 1% Lidocaine 3 mls via a 25g needle.  Utilizing ultrasound guidance, a BBraun 2 inch 18 g Contiplex echogenic touhy needle was advanced in-plane.  Hydro dissection of tissue was achieved with Normal saline. Major vessels(carotid and Internal Jugular) where visualized as the brachial plexus was approached at the approximate level of C-7/ T-1.  Cervical 5 and Branches of Cervical 6 nerve roots where visualized and the needle tip was placed posterior at the level of C-6 roots.  LA spread was visualized and  injection was made incrementally every 5 mls with aspiration. Injection pressure was normal or little, there was no intraneural injection, no vascular injection.      The BBraun 20 g wire stylet  catheter was then placed under US guidance on the posterior aspect of the Brachial Plexus.  Location of catheter was confirmed with NS injection visualized with US . The tuohy was then removed and the skin was sealed with Skin AFix at catheter insertion site.  Skin was prepped with mastisol and the labeled catheter  was secured with steristrips and a CHG tegaderm. Thank You.

## 2018-10-15 NOTE — PLAN OF CARE
Problem: Patient Care Overview  Goal: Plan of Care Review  Outcome: Ongoing (interventions implemented as appropriate)   10/15/18 1611   Coping/Psychosocial   Plan of Care Reviewed With patient;spouse   OTHER   Outcome Summary Interscalene running at 10, post pain 4/10 left axilla area. Pt tolerated L shoulder PROM , IR chest/ER 30. Spouse not present for teachback. Pt passed mobility screen, please DC PT. Recommend DC home with family assist following family training in AM.

## 2018-10-15 NOTE — THERAPY EVALUATION
Acute Care - Occupational Therapy Initial Evaluation  [facility]     Patient Name: Levi Cain  : 1950  MRN: [id number]  Today's Date: 10/15/2018  Onset of Illness/Injury or Date of Surgery: 10/15/18  Date of Referral to OT: 10/15/18  Referring Physician: Dr. Amador    Admit Date: 10/15/2018     No diagnosis found.  Patient Active Problem List   Diagnosis   • Status post reverse total replacement of left shoulder   • HLD (hyperlipidemia)   • HTN (hypertension)   • Pacemaker   • History of stroke   • Prediabetes     Past Medical History:   Diagnosis Date   • Arthritis     left shoulder, knees, multi joint    • Cancer (CMS/HCC)     prostate cancer-monitoring levels currently-no current treatment    • Hearing loss    • History of CVA (cerebrovascular accident)    • History of depression    • History of kidney stones     passed   • History of transfusion     as a baby   • Hypertension    • Mitral regurgitation    • Pacemaker     bradycardia/tachycardia    • Peripheral vision loss     right side   • Pott's disease    • Shortness of breath    • Shoulder pain    • Slow to wake up after anesthesia     per pt    • Wears dentures     full   • Wears glasses    • Wears hearing aid in both ears      Past Surgical History:   Procedure Laterality Date   • COLONOSCOPY     • EYE SURGERY Right     lens implant-due to injury from a piece of metal    • HERNIA REPAIR      inguinal hernia as well as umbilical    • REPLACEMENT TOTAL KNEE Left    • TOTAL SHOULDER REPLACEMENT Right    • TOTAL SHOULDER REVERSE ARTHROPLASTY Right           OT ASSESSMENT FLOWSHEET (last 72 hours)      Occupational Therapy Evaluation     Row Name 10/15/18 1611                   OT Evaluation Time/Intention    Subjective Information no complaints  -AR        Document Type evaluation  -AR        Mode of Treatment occupational therapy  -AR        Patient Effort excellent  -AR        Symptoms Noted During/After Treatment none  -AR            General Information    Patient Profile Reviewed? yes  -AR        Onset of Illness/Injury or Date of Surgery 10/15/18  -AR        Referring Physician Dr. Amador  -AR        Patient Observations alert;cooperative;agree to therapy  -AR        Patient/Family Observations pt up in chair, no family at bedside  -AR        Prior Level of Function independent:;all household mobility;community mobility;gait;transfer;min assist:;ADL's;home management;yard work  -AR        Equipment Currently Used at Home shower chair;raised toilet;cane, straight  -AR        Pertinent History of Current Functional Problem Pt is a 68 yom admitted for surgical management of progressive left shoulder pain and dysfunction that failed to improve with conservative measures. Pt is POD#0 left RTSA and left biceps tenodesis. OT orders indicate L shoulder PROM FE no limit, IR chest/ER 30, AROM elbow/wrist/hand and scapular retractions. Pt is right-hand dominant. Prior to admission, pt had pain and difficulty with UB dressing, yard work, completing  work on vehicles and sleep was interrupted by pain.  -AR        Existing Precautions/Restrictions fall;shoulder;left;non-weight bearing   [employer] Ultra II sling, interscalene  -AR        Limitations/Impairments insensate body part  -AR        Risks Reviewed patient:;LOB;nausea/vomiting;dizziness;increased discomfort;change in vital signs;increased drainage;lines disloged  -AR        Benefits Reviewed patient:;improve function;increase independence;increase strength;increase balance;decrease pain;decrease risk of DVT;increase knowledge  -AR        Barriers to Rehab none identified  -AR           Relationship/Environment    Primary Source of Support/Comfort spouse;child(shamika)  -AR        Lives With spouse  -AR        Expected Impact of Illness/Hospitalization Family available to assist at all times  -AR           Resource/Environmental Concerns    Current Living Arrangements home/apartment/condo  -AR         Resource/Environmental Concerns home accessibility  -AR        Home Accessibility Concerns stairs to enter home;not wheelchair accessible  -AR        Transportation Concerns car, none  -AR           Home Main Entrance    Number of Stairs, Main Entrance four  -AR        Stair Railings, Main Entrance railings on both sides of stairs  -AR           Cognitive Assessment/Interventions    Additional Documentation Cognitive Assessment/Intervention (Group)  -AR           Cognitive Assessment/Intervention- PT/OT    Affect/Mental Status (Cognitive) WNL  -AR        Orientation Status (Cognition) oriented x 4  -AR        Follows Commands (Cognition) WNL  -AR        Cognitive Function (Cognitive) WNL  -AR        Personal Safety Interventions fall prevention program maintained  -AR           Safety Issues, Functional Mobility    Impairments Affecting Function (Mobility) pain;sensation/sensory awareness;range of motion (ROM)   left shoulder precautions  -AR           Mobility Assessment/Treatment    Extremity Weight-bearing Status left upper extremity  -AR        Left Upper Extremity (Weight-bearing Status) non weight-bearing (NWB)  -AR           Bed Mobility Assessment/Treatment    Bed Mobility Assessment/Treatment supine-sit;scooting/bridging  -AR        Scooting/Bridging [place] (Bed Mobility) supervision;verbal cues  -AR        Supine-Sit [place] (Bed Mobility) minimum assist (75% patient effort);verbal cues  -AR        Bed Mobility, Safety Issues decreased use of arms for pushing/pulling  -AR        Comment (Bed Mobility) Educated pt on NWB LUE and importance of maintaining at all times, including bed mobility, discussed safe sleeping positions and use of recliner as needed for comfort  -AR           Functional Mobility    Functional Mobility- Ind. Level supervision required  -AR        Functional Mobility-Distance (Feet) 300  -AR        Functional Mobility- Comment No issues with dyspnea or LOB. Pt passed  mobility screen with score of 22.   -AR           Transfer Assessment/Treatment    Transfer Assessment/Treatment sit-stand transfer;stand-sit transfer;bed-chair transfer  -AR        Maintains Weight-bearing Status (Transfers) able to maintain  -AR        Comment (Transfers) Cues for line management   -AR           Bed-Chair Transfer    Bed-Chair [place] (Transfers) supervision;verbal cues  -AR           Sit-Stand Transfer    Sit-Stand [place] (Transfers) supervision;verbal cues  -AR           Stand-Sit Transfer    Stand-Sit [place] (Transfers) supervision;verbal cues  -AR           ADL Assessment/Intervention    68248 - OT Self Care/Mgmt Minutes 33  -AR        BADL Assessment/Intervention bathing;upper body dressing;lower body dressing;feeding  -AR           Bathing Assessment/Intervention    Comment (Bathing) Educated pt on left shoulder precautions and left axilla care to maintain, also that interscalene dressing site is not water proof and pt may shower once nerve catheter has been DC.   -AR           Upper Body Dressing Assessment/Training    Upper Body Dressing [place] Level doff;don;pajama/yoana   LUE sling  -AR        Assistive Devices (Upper Body Dressing) one hand technique  -AR        Upper Body Dressing Position supported sitting  -AR        Comment (Upper Body Dressing) Educated pt on left shoulder precautions, ADL retraining to maintain, care of nerve catheter with ADLs to avoid dislodgement, and sling management including applicaiton, wear schedule, proper fit and laundering.   -AR           Lower Body Dressing Assessment/Training    Lower Body Dressing [place] Level don;socks;maximum assist (25% patient effort)  -AR        Lower Body Dressing Position supported sitting  -AR           Self-Feeding Assessment/Training    [place] Level (Feeding) feeding skills;supervision;set up  -AR        Self-Feeding Assess/Train, Spillage Amount minimal  -AR        Position (Self-Feeding)  supported sitting  -AR           BADL Safety/Performance    Impairments, BADL Safety/Performance pain;range of motion;sensory awareness  -AR        Skilled BADL Treatment/Intervention BADL process/adaptation training;compensatory training;zack/one-hand technique  -AR           General ROM    GENERAL ROM COMMENTS RUE WFL, L elbow/wrist/hand WFL  -AR           MMT (Manual Muscle Testing)    General MMT Comments RUE WFL, LUE deferred  -AR           Motor Assessment/Interventions    Additional Documentation Balance (Group);Balance Interventions (Group);Fine Motor Testing & Training (Group);Therapeutic Exercise (Group);Therapeutic Exercise Interventions (Group)  -AR           Therapeutic Exercise    07872 - OT Therapeutic Exercise Minutes 24  -AR           Therapeutic Exercise    Upper Extremity Range of Motion (Therapeutic Exercise) shoulder flexion/extension, left;shoulder internal/external rotation, left;elbow flexion/extension, left;forearm supination/pronation, left;wrist flexion/extension, left   bilateral scapular retraction  -AR        Hand (Therapeutic Exercise) finger flexion/extension, left;thumb finger opposition, left  -AR        Exercise Type (Therapeutic Exercise) AROM (active range of motion);PROM (passive range of motion);AAROM (active assistive range of motion)   AROM scap, AA hand/wrist PROM FA/elbow/IR/ER/FE  -AR        Position (Therapeutic Exercise) seated  -AR        Sets/Reps (Therapeutic Exercise) 1/10  -AR        Comment (Therapeutic Exercise) Issued and reviewed LUE HEP. Interscalene running at 10, pt tolerated L shoulder PROM , IR chest/ER 30. Spouse not present for HEP, reviewed exercise with her and she will complete teachback at next visit.   -AR           Balance    Balance static sitting balance;static standing balance  -AR           Static Sitting Balance    Level of [place] (Unsupported Sitting, Static Balance) supervision  -AR        Sitting Position (Unsupported Sitting,  Static Balance) sitting in chair  -AR        Time Able to Maintain Position (Unsupported Sitting, Static Balance) more than 5 minutes  -AR           Static Standing Balance    Level of [place] (Supported Standing, Static Balance) supervision  -AR        Time Able to Maintain Position (Supported Standing, Static Balance) 2 to 3 minutes  -AR           Fine Motor Testing & Training    Comment, Fine Motor Coordination unable to oppose L hand  -AR           Sensory Assessment/Intervention    Sensory General Assessment light touch sensation deficits identified  -AR        Additional Documentation Vision Assessment/Intervention (Group)  -AR           Light Touch Sensation Assessment    Left Upper Extremity: Light Touch Sensation Assessment severe impairment, less than 50% correct responses  -AR        Right Upper Extremity: Light Touch Sensation Assessment intact  -AR           Vision Assessment/Intervention    Visual Impairment/Limitations corrective lenses full time  -AR           Positioning and Restraints    Pre-Treatment Position in bed  -AR        Post Treatment Position chair  -AR        In Chair sitting;call light within reach;encouraged to call for assist;exit alarm on;with family/caregiver;with brace  -AR           Pain Assessment    Additional Documentation Pain Scale: Numbers Pre/Post-Treatment (Group)  -AR           Pain Scale: Numbers Pre/Post-Treatment    Pain Scale: Numbers, Pretreatment 5/10  -AR        Pain Scale: Numbers, Post-Treatment 4/10  -AR        Pain Location - Side Left  -AR        Pain Location - Orientation --   axilla  -AR        Pain Intervention(s) Repositioned;Ambulation/increased activity   interscalene at 10, pt declined need for pain medication  -AR           Orthotics & Prosthetics Management    Orthosis Location upper extremity orthosis  -AR        Additional Documentation Orthosis Location (Row)  -AR           Upper Extremity Orthosis Management    Type (Upper Extremity  Orthosis) [employer] Ultra II sling  -AR        Functional Design (Upper Extremity Orthosis) static orthosis  -AR        Therapeutic Indications (Upper Extremity Orthosis) post-op positioning/protection;stabilization and support  -AR        Wearing Schedule (Upper Extremity Orthosis) remove for exercise;remove for hygiene/bathing  -AR        Orthosis Training (Upper Extremity Orthosis) patient;all orthosis skills;activity limitations/precautions;cleaning/care of orthosis;donning/doffing orthosis;orthosis adjustment;orthosis maintenance;purpose/goals of orthosis;sensory precautions;skin inspection/precautions;sleeping precautions;wearing schedule;able to verbalize training  -AR        Compliance/Wearing Issues (Upper Extremity Orthosis) patient/caregiver comprehend strategies  -AR           Wound 10/15/18 0837 Left shoulder incision    Wound - Properties Group Date first assessed: 10/15/18  -JA Time first assessed: 0837  -JA Side: Left  -JA Location: shoulder  -JA Type: incision  -JA       Clinical Impression (OT)    Date of Referral to OT 10/15/18  -AR        OT Diagnosis decreased independence with ADLs  -AR        Patient/Family Goals Statement (OT Eval) complete  work w/out pain  -AR        Criteria for Skilled Therapeutic Interventions Met (OT Eval) yes;treatment indicated  -AR        Rehab Potential (OT Eval) good, to achieve stated therapy goals  -AR        Therapy Frequency (OT Eval) daily  -AR        Anticipated Discharge Disposition (OT) home with assist  -AR           Vital Signs    Pretreatment Heart Rate (beats/min) 69  -AR        Intratreatment Heart Rate (beats/min) 70  -AR        Posttreatment Heart Rate (beats/min) 71  -AR        Pre SpO2 (%) 96  -AR        O2 Delivery Pre Treatment room air  -AR        Intra SpO2 (%) 99  -AR        O2 Delivery Intra Treatment room air  -AR        Post SpO2 (%) 98  -AR        O2 Delivery Post Treatment room air  -AR        Pre Patient Position Supine  -AR         Intra Patient Position Sitting  -AR        Post Patient Position Sitting  -AR           OT Goals    Transfer Goal Selection (OT) transfer, OT goal 1  -AR        Dressing Goal Selection (OT) dressing, OT goal 1  -AR        ROM Goal Selection (OT) ROM, OT goal 1  -AR        Problem Specific Goal Selection (OT) problem specific goal 1, OT  -AR        Additional Documentation Problem Specific Goal Selection (OT) (Row);ROM Goal Selection (OT) (Row)  -AR           Transfer Goal 1 (OT)    Activity/Assistive Device (Transfer Goal 1, OT) sit-to-stand/stand-to-sit;bed-to-chair/chair-to-bed  -AR        [place] Level/Cues Needed (Transfer Goal 1, OT) verbal cues required;contact guard assist  -AR        Time Frame (Transfer Goal 1, OT) short term goal (STG);3 days  -AR        Progress/Outcome (Transfer Goal 1, OT) goal met  -AR           Dressing Goal 1 (OT)    Activity/Assistive Device (Dressing Goal 1, OT) --   Pt and spouse will don/doff LUE sling  -AR        [place]/Cues Needed (Dressing Goal 1, OT) supervision required;verbal cues required  -AR        Time Frame (Dressing Goal 1, OT) short term goal (STG);3 days  -AR        Progress/Outcome (Dressing Goal 1, OT) goal ongoing  -AR           ROM Goal 1 (OT)    ROM Goal 1 (OT) Pt and spouse will complete LUE HEP within physician parameters with supervision  -AR        Time Frame (ROM Goal 1, OT) short term goal (STG);3 days  -AR        Progress/Outcome (ROM Goal 1, OT) goal ongoing  -AR           Problem Specific Goal 1 (OT)    Problem Specific Goal 1 (OT) Pt and spouse will verbalize/demo care of nerve catheter during ADLs to avoid dislodgement and will mainain left shoulder precautions during UB dressing task with min cues  -AR        Time Frame (Problem Specific Goal 1, OT) short term goal (STG);3 days  -AR        Progress/Outcome (Problem Specific Goal 1, OT) goal ongoing  -AR           Living Environment    Home Accessibility stairs to enter home  -AR           User Key  (r) = Recorded By, (t) = Taken By, (c) = Cosigned By    Initials Name Effective Dates    AR Blossom Poole OT 06/22/15 -     Magdi Coreas RN 06/16/16 -            Occupational Therapy Education     Title: PT OT SLP Therapies (Done)     Topic: Occupational Therapy (Done)     Point: ADL training (Done)     Description: Instruct learner(s) on proper safety adaptation and remediation techniques during self care or transfers.   Instruct in proper use of assistive devices.   Learning Progress Summary     Learner Status Readiness Method Response Comment Documented by    Patient Done Eager E,TB,D,H VU,NR  AR 10/15/18 1611    Family Done Eager E,TB,D,H VU,NR  AR 10/15/18 1611          Point: Home exercise program (Done)     Description: Instruct learner(s) on appropriate technique for monitoring, assisting and/or progressing therapeutic exercises/activities.   Learning Progress Summary     Learner Status Readiness Method Response Comment Documented by    Patient Done Eager E,TB,D,H VU,NR  AR 10/15/18 1611    Family Done Eager E,TB,D,H VU,NR  AR 10/15/18 1611          Point: Precautions (Done)     Description: Instruct learner(s) on prescribed precautions during self-care and functional transfers.   Learning Progress Summary     Learner Status Readiness Method Response Comment Documented by    Patient Done Eager E,TB,D,H VU,NR  AR 10/15/18 1611    Family Done Eager E,TB,D,H VU,NR  AR 10/15/18 1611          Point: Body mechanics (Done)     Description: Instruct learner(s) on proper positioning and spine alignment during self-care, functional mobility activities and/or exercises.   Learning Progress Summary     Learner Status Readiness Method Response Comment Documented by    Patient Done Eager E,TB,D,H VU,NR  AR 10/15/18 1611    Family Done Eager E,TB,D,H VU,NR  AR 10/15/18 1611                      User Key     Initials Effective Dates Name Provider Type Discipline    AR 06/22/15 -  Blossom Poole  Grazyna OT Occupational Therapist OT                  OT Recommendation and Plan  Outcome Summary/Treatment Plan (OT)  Anticipated Discharge Disposition (OT): home with assist  Therapy Frequency (OT Eval): daily  Plan of Care Review  Plan of Care Reviewed With: patient, spouse  Plan of Care Reviewed With: patient, spouse  Outcome Summary: Interscalene running at 10, post pain 4/10 left axilla area. Pt tolerated L shoulder PROM , IR chest/ER 30. Spouse not present for teachback. Pt passed mobility screen, please DC PT. Recommend DC home with family assist following family training in AM.           Outcome Measures     Row Name 10/15/18 1611             How much help from another is currently needed...    Putting on and taking off regular lower body clothing? 2  -AR      Bathing (including washing, rinsing, and drying) 2  -AR      Toileting (which includes using toilet bed pan or urinal) 2  -AR      Putting on and taking off regular upper body clothing 2  -AR      Taking care of personal grooming (such as brushing teeth) 3  -AR      Eating meals 3  -AR      Score 14  -AR         Functional Assessment    Outcome Measure Options AM-PAC 6 Clicks Daily Activity (OT)  -AR        User Key  (r) = Recorded By, (t) = Taken By, (c) = Cosigned By    Initials Name Provider Type    Blossom Donovan OT Occupational Therapist          Time Calculation:         Time Calculation- OT     Row Name 10/15/18 1611             Time Calculation- OT    OT Start Time 1611  -AR      Total Timed Code Minutes- OT 57 minute(s)  -AR      OT Received On 10/15/18  -AR      OT Goal Re-Cert Due Date 10/25/18  -AR         Timed Charges    14645 - OT Therapeutic Exercise Minutes 24  -AR      30274 - OT Self Care/Mgmt Minutes 33  -AR        User Key  (r) = Recorded By, (t) = Taken By, (c) = Cosigned By    Initials Name Provider Type    Blossom Donovan OT Occupational Therapist        Therapy Suggested Charges     Code   Minutes Charges     80208 (CPT®) Hc Ot Neuromusc Re Education Ea 15 Min      86524 (CPT®) Hc Ot Ther Proc Ea 15 Min 24 2    25769 (CPT®) Hc Ot Therapeutic Act Ea 15 Min      50078 (CPT®) Hc Ot Manual Therapy Ea 15 Min      08514 (CPT®) Hc Ot Iontophoresis Ea 15 Min      77411 (CPT®) Hc Ot Elec Stim Ea-Per 15 Min      28941 (CPT®) Hc Ot Ultrasound Ea 15 Min      86140 (CPT®) Hc Ot Self Care/Mgmt/Train Ea 15 Min 33 2    Total  57 4        Therapy Charges for Today     Code Description Service Date Service Provider Modifiers Qty    27004131726 HC OT SELFCARE CURRENT 10/15/2018 Blossom Poole OT GO, CK 1    32113900138 HC OT SELFCARE PROJECTED 10/15/2018 Blossom Poole OT GO, CJ 1    13475286652 HC OT THER PROC EA 15 MIN 10/15/2018 Blossom Poole, OT GO 2    92693334947 HC OT SELF CARE/MGMT/TRAIN EA 15 MIN 10/15/2018 Blossom Poole OT GO 2    41900088674 HC OT EVAL MOD COMPLEXITY 4 10/15/2018 Blossom Poole, OT GO 1    00234355157 HC OT THER SUPP EA 15 MIN 10/15/2018 Blossom Poole OT GO 3          OT G-codes  OT Professional Judgement Used?: Yes  OT Functional Scales Options: AM-PAC 6 Clicks Daily Activity (OT)  Functional Assessment Tool Used: 6 clicks  Score: 14  Functional Limitation: Self care  Self Care Current Status (): At least 40 percent but less than 60 percent impaired, limited or restricted  Self Care Goal Status (): At least 20 percent but less than 40 percent impaired, limited or restricted    Blossom Poole OT  10/15/2018

## 2018-10-16 VITALS
HEART RATE: 70 BPM | OXYGEN SATURATION: 98 % | SYSTOLIC BLOOD PRESSURE: 108 MMHG | BODY MASS INDEX: 27.85 KG/M2 | RESPIRATION RATE: 18 BRPM | WEIGHT: 188 LBS | DIASTOLIC BLOOD PRESSURE: 64 MMHG | TEMPERATURE: 97.4 F | HEIGHT: 69 IN

## 2018-10-16 PROBLEM — G89.18 ACUTE POSTOPERATIVE PAIN: Status: ACTIVE | Noted: 2018-10-16

## 2018-10-16 PROBLEM — D62 ACUTE BLOOD LOSS ANEMIA: Status: ACTIVE | Noted: 2018-10-16

## 2018-10-16 LAB
ANION GAP SERPL CALCULATED.3IONS-SCNC: 5 MMOL/L (ref 3–11)
BASOPHILS # BLD AUTO: 0.01 10*3/MM3 (ref 0–0.2)
BASOPHILS NFR BLD AUTO: 0.1 % (ref 0–1)
BUN BLD-MCNC: 13 MG/DL (ref 9–23)
BUN/CREAT SERPL: 13.3 (ref 7–25)
CALCIUM SPEC-SCNC: 8.3 MG/DL (ref 8.7–10.4)
CHLORIDE SERPL-SCNC: 103 MMOL/L (ref 99–109)
CO2 SERPL-SCNC: 29 MMOL/L (ref 20–31)
CREAT BLD-MCNC: 0.98 MG/DL (ref 0.6–1.3)
DEPRECATED RDW RBC AUTO: 43.6 FL (ref 37–54)
EOSINOPHIL # BLD AUTO: 0.01 10*3/MM3 (ref 0–0.3)
EOSINOPHIL NFR BLD AUTO: 0.1 % (ref 0–3)
ERYTHROCYTE [DISTWIDTH] IN BLOOD BY AUTOMATED COUNT: 12.9 % (ref 11.3–14.5)
GFR SERPL CREATININE-BSD FRML MDRD: 77 ML/MIN/1.73
GLUCOSE BLD-MCNC: 128 MG/DL (ref 70–100)
GLUCOSE BLDC GLUCOMTR-MCNC: 133 MG/DL (ref 70–130)
HCT VFR BLD AUTO: 35.8 % (ref 38.9–50.9)
HGB BLD-MCNC: 12 G/DL (ref 13.1–17.5)
IMM GRANULOCYTES # BLD: 0.03 10*3/MM3 (ref 0–0.03)
IMM GRANULOCYTES NFR BLD: 0.3 % (ref 0–0.6)
LYMPHOCYTES # BLD AUTO: 0.85 10*3/MM3 (ref 0.6–4.8)
LYMPHOCYTES NFR BLD AUTO: 9 % (ref 24–44)
MCH RBC QN AUTO: 30.9 PG (ref 27–31)
MCHC RBC AUTO-ENTMCNC: 33.5 G/DL (ref 32–36)
MCV RBC AUTO: 92.3 FL (ref 80–99)
MONOCYTES # BLD AUTO: 0.84 10*3/MM3 (ref 0–1)
MONOCYTES NFR BLD AUTO: 8.9 % (ref 0–12)
NEUTROPHILS # BLD AUTO: 7.73 10*3/MM3 (ref 1.5–8.3)
NEUTROPHILS NFR BLD AUTO: 81.6 % (ref 41–71)
PLATELET # BLD AUTO: 128 10*3/MM3 (ref 150–450)
PMV BLD AUTO: 10.2 FL (ref 6–12)
POTASSIUM BLD-SCNC: 3.7 MMOL/L (ref 3.5–5.5)
RBC # BLD AUTO: 3.88 10*6/MM3 (ref 4.2–5.76)
SODIUM BLD-SCNC: 137 MMOL/L (ref 132–146)
WBC NRBC COR # BLD: 9.47 10*3/MM3 (ref 3.5–10.8)

## 2018-10-16 PROCEDURE — 80048 BASIC METABOLIC PNL TOTAL CA: CPT | Performed by: ORTHOPAEDIC SURGERY

## 2018-10-16 PROCEDURE — G8988 SELF CARE GOAL STATUS: HCPCS | Performed by: OCCUPATIONAL THERAPIST

## 2018-10-16 PROCEDURE — 97110 THERAPEUTIC EXERCISES: CPT | Performed by: OCCUPATIONAL THERAPIST

## 2018-10-16 PROCEDURE — 82962 GLUCOSE BLOOD TEST: CPT

## 2018-10-16 PROCEDURE — G8987 SELF CARE CURRENT STATUS: HCPCS | Performed by: OCCUPATIONAL THERAPIST

## 2018-10-16 PROCEDURE — 97535 SELF CARE MNGMENT TRAINING: CPT | Performed by: OCCUPATIONAL THERAPIST

## 2018-10-16 PROCEDURE — 63710000001 HYDROCODONE-ACETAMINOPHEN 10-325 MG TABLET: Performed by: ORTHOPAEDIC SURGERY

## 2018-10-16 PROCEDURE — 85025 COMPLETE CBC W/AUTO DIFF WBC: CPT | Performed by: ORTHOPAEDIC SURGERY

## 2018-10-16 PROCEDURE — G8989 SELF CARE D/C STATUS: HCPCS | Performed by: OCCUPATIONAL THERAPIST

## 2018-10-16 PROCEDURE — A9270 NON-COVERED ITEM OR SERVICE: HCPCS | Performed by: ORTHOPAEDIC SURGERY

## 2018-10-16 RX ORDER — PSEUDOEPHEDRINE HCL 30 MG
100 TABLET ORAL 2 TIMES DAILY PRN
Qty: 60 CAPSULE | Refills: 0 | Status: SHIPPED | OUTPATIENT
Start: 2018-10-16

## 2018-10-16 RX ORDER — HYDROCODONE BITARTRATE AND ACETAMINOPHEN 10; 325 MG/1; MG/1
1 TABLET ORAL EVERY 4 HOURS PRN
Qty: 40 TABLET | Refills: 0 | Status: SHIPPED | OUTPATIENT
Start: 2018-10-16 | End: 2018-10-25

## 2018-10-16 RX ADMIN — HYDROCODONE BITARTRATE AND ACETAMINOPHEN 1 TABLET: 10; 325 TABLET ORAL at 11:03

## 2018-10-16 NOTE — THERAPY DISCHARGE NOTE
Acute Care - Occupational Therapy Treatment Note/Discharge   [place]     Patient Name: Levi Cain  : 1950  MRN: [id number]  Today's Date: 10/16/2018  Onset of Illness/Injury or Date of Surgery: 10/15/18  Date of Referral to OT: 10/15/18  Referring Physician: Dr. Amador      Admit Date: 10/15/2018    Visit Dx:   No diagnosis found.  Patient Active Problem List   Diagnosis   • Status post reverse total replacement of left shoulder   • HLD (hyperlipidemia)   • HTN (hypertension)   • Pacemaker   • History of stroke   • Prediabetes       Therapy Treatment          Rehabilitation Treatment Summary     Row Name 10/16/18 0859             Treatment Time/Intention    Discipline occupational therapist  -AR      Document Type therapy note (daily note);discharge treatment  -AR      Subjective Information no complaints  -AR      Mode of Treatment occupational therapy  -AR      Patient/Family Observations   pt up in chair, family at bedside  -AR      Patient Effort excellent  -AR      Existing Precautions/Restrictions shoulder;non-weight bearing;left   Source MDx Ultra II sling, interscalene nerve catheter  -AR      Recorded by [AR] Blossom Poole OT 10/16/18 1347      Row Name 10/16/18 0859             Vital Signs    Pretreatment Heart Rate (beats/min) 69  -AR      Intratreatment Heart Rate (beats/min) 70  -AR      Posttreatment Heart Rate (beats/min) 70  -AR      Pre SpO2 (%) 97  -AR      O2 Delivery Pre Treatment room air  -AR      Intra SpO2 (%) 98  -AR      O2 Delivery Intra Treatment room air  -AR      Post SpO2 (%) 98  -AR      O2 Delivery Post Treatment room air  -AR      Pre Patient Position Sitting  -AR      Intra Patient Position Standing  -AR      Post Patient Position Standing  -AR      Recorded by [AR] Blossom Poole OT 10/16/18 1347      Row Name 10/16/18 0859             Cognitive Assessment/Intervention- PT/OT    Affect/Mental Status (Cognitive) WNL  -AR      Orientation Status (Cognition)  oriented x 4  -AR      Follows Commands (Cognition) WNL  -AR      Cognitive Function (Cognitive) WNL  -AR      Personal Safety Interventions fall prevention program maintained  -AR      Recorded by [AR] Blossom Poole, OT 10/16/18 1347      Row Name 10/16/18 0859             Safety Issues, Functional Mobility    Impairments Affecting Function (Mobility) pain;range of motion (ROM);sensation/sensory awareness   left shoulder precautions  -AR      Recorded by [AR] Blossom Poole OT 10/16/18 1347      Row Name 10/16/18 0859             Mobility Assessment/Intervention    Extremity Weight-bearing Status left upper extremity  -AR      Left Upper Extremity (Weight-bearing Status) non weight-bearing (NWB)  -AR      Recorded by [AR] Blossom Poole OT 10/16/18 1347      Row Name 10/16/18 0859             Bed Mobility Assessment/Treatment    Comment (Bed Mobility) Did not observe, reviewed safe sleeping positions and importance of maintaining NWB LUE AAT  -AR      Recorded by [AR] Blossom Poole, OT 10/16/18 1347      Row Name 10/16/18 0859             Functional Mobility    Functional Mobility- Ind. Level independent  -AR      Functional Mobility-Distance (Feet) 300  -AR      Functional Mobility- Comment no dyspnea or LOB  -AR      Recorded by [AR] Blossom Poole, OT 10/16/18 1347      Row Name 10/16/18 0859             Transfer Assessment/Treatment    Transfer Assessment/Treatment sit-stand transfer;stand-sit transfer  -AR      Maintains Weight-bearing Status (Transfers) able to maintain  -AR      Comment (Transfers) Cues to attend to location of nerve catheter  -AR      Recorded by [AR] Blossom Poole OT 10/16/18 1347      Row Name 10/16/18 0859             Sit-Stand Transfer    Sit-Stand [place] (Transfers) supervision;verbal cues  -AR      Recorded by [AR] Blossom Poole OT 10/16/18 1347      Row Name 10/16/18 0859             Stand-Sit Transfer    Stand-Sit [place]  (Transfers) supervision;verbal cues  -AR      Recorded by [AR] Blossom Poole OT 10/16/18 1347      Row Name 10/16/18 0859             ADL Assessment/Intervention    88961 - OT Self Care/Mgmt Minutes 43  -AR      BADL Assessment/Intervention bathing;upper body dressing;lower body dressing;grooming  -AR      Recorded by [AR] Blossom Poole OT 10/16/18 1347      Row Name 10/16/18 0859             Bathing Assessment/Intervention    Comment (Bathing) Reviewed left shoulder precautions, left axilla care to maintain, and that pt may shower once disposable nerve catheter has been DC  -AR      Recorded by [AR] Blossom Poole OT 10/16/18 1347      Row Name 10/16/18 0859             Upper Body Dressing Assessment/Training    Upper Body Dressing [place] Level doff;pajama/robe;don;front opening garment;maximum assist (25% patient effort)   LUE sling  -AR      Assistive Devices (Upper Body Dressing) one hand technique  -AR      Upper Body Dressing Position supported sitting  -AR      Comment (Upper Body Dressing) Reviewed with pt and family left shoulder precautions, ADL retraining to maintain, care of disposable nerve catheter with ADLS to avoid dislodgement and sling management including applcition/wear schedule/proper fit. Post teaching, pt and spouse able to don/doff LUE sling with min assist. Spouse not attending to cues for application. Pt able to verbalize correct placement of straps. OT intended to review sling application following abluation if spouse felt it was warranted, however spouse and son left to get breakfast while pt was ambulating. OT issued handout with video link on sling application upon initial evaluation. Pt has been in same sling 2 years ago for prior repalcement surgery and pt vebalized feeling comfortable with sling management.    -AR2      Recorded by [AR] Blossom Poole OT 10/16/18 1347  [AR2] Blossom Poole OT 10/16/18 1401      Row Name 10/16/18 0879              Grooming Assessment/Training    [place] Level (Grooming) grooming skills;supervision;verbal cues  -AR      Grooming Position supported sitting  -AR      Recorded by [AR] Blossom Poole, OT 10/16/18 1347      Row Name 10/16/18 0859             Therapeutic Exercise    24423 - OT Therapeutic Exercise Minutes 25  -AR      Recorded by [AR] Blossom Poole OT 10/16/18 1347      Row Name 10/16/18 0859             Therapeutic Exercise    Upper Extremity Range of Motion (Therapeutic Exercise) shoulder flexion/extension, left;shoulder internal/external rotation, left;elbow flexion/extension, left;forearm supination/pronation, left;wrist flexion/extension, left   scapualr retractions- left  -AR      Hand (Therapeutic Exercise) finger flexion/extension, left  -AR      Exercise Type (Therapeutic Exercise) PROM (passive range of motion);AROM (active range of motion);AAROM (active assistive range of motion)   AROM scap/hand/wrist/FA, AA elbow PROM FE/IR/ER  -AR      Position (Therapeutic Exercise) seated  -AR      Sets/Reps (Therapeutic Exercise) 1/10  -AR      Comment (Therapeutic Exercise) Reviewed LUE HEP with pt and spouse, they completed LUE HEP with supervision post teaching. Pt tolerated L shoulder PROM , IR chest/ER 25 with good good teachback from spouse. Reviewed that pt shoulder not assist caregiver with PROM and reviewed positioning to complete. Reviewed that pt will complete HEP for 6 weeks, until changes by Dr. Amaodr.   -AR      Recorded by [AR] Blossom Poole OT 10/16/18 1347      Row Name 10/16/18 0859             Balance    Balance static sitting balance;static standing balance  -AR      Recorded by [AR] Blossom Poole OT 10/16/18 1347      Row Name 10/16/18 0859             Static Sitting Balance    Level of [place] (Unsupported Sitting, Static Balance) independent  -AR      Sitting Position (Unsupported Sitting, Static Balance) sitting in chair  -AR      Time Able to  Maintain Position (Unsupported Sitting, Static Balance) more than 5 minutes  -AR      Recorded by [AR] Blossom Poole, OT 10/16/18 1347      Row Name 10/16/18 0859             Static Standing Balance    Level of [place] (Supported Standing, Static Balance) independent  -AR      Time Able to Maintain Position (Supported Standing, Static Balance) 2 to 3 minutes  -AR      Recorded by [AR] Blossom Poole, OT 10/16/18 1347      Row Name 10/16/18 0859             Fine Motor Testing & Training    Comment, Fine Motor Coordination L opposition intact  -AR      Recorded by [AR] Blossom Poole OT 10/16/18 1347      Row Name 10/16/18 0859             Positioning and Restraints    Pre-Treatment Position sitting in chair/recliner  -AR      Post Treatment Position chair  -AR      In Chair sitting;call light within reach;encouraged to call for assist;exit alarm on;with family/caregiver;with brace  -AR      Recorded by [AR] Blossom Poole, ANDRE 10/16/18 1347      Row Name 10/16/18 0859             Pain Scale: Numbers Pre/Post-Treatment    Pain Scale: Numbers, Pretreatment 2/10  -AR      Pain Scale: Numbers, Post-Treatment 5/10  -AR      Pain Location - Side Left  -AR      Pain Location - Orientation --   axilla  -AR      Pain Intervention(s) Repositioned;Ambulation/increased activity   notified RN of pt's request for pain medication  -AR      Recorded by [AR] Blossom Poole, OT 10/16/18 1347      Row Name                Wound 10/15/18 0837 Left shoulder incision    Wound - Properties Group Date first assessed: 10/15/18 [JA] Time first assessed: 0837 [JA] Side: Left [JA] Location: shoulder [JA] Type: incision [JA] Recorded by:  [JA] Magdi Rodriguez RN 10/15/18 0837    Row Name 10/16/18 0859             Outcome Summary/Treatment Plan (OT)    Daily Summary of Progress (OT) progress toward functional goals as expected;prepare for discharge  -AR      Anticipated Discharge Disposition (OT) home with assist   -AR      Reason for Discharge (OT Discharge Summary) patient discharged from this facility  -AR      Recorded by [AR] Blossom Poole Grazyna, OT 10/16/18 1353        User Key  (r) = Recorded By, (t) = Taken By, (c) = Cosigned By    Initials Name Effective Dates Discipline    AR Blossom Poole Grazyna, OT 06/22/15 -  OT    Magdi Coreas RN 06/16/16 -  Nurse        Wound 10/15/18 0837 Left shoulder incision (Active)   Dressing Appearance dry;intact 10/16/2018  9:00 AM   Closure DAYA 10/16/2018  6:00 AM   Base dressing in place, unable to visualize 10/16/2018  6:00 AM   Drainage Amount none 10/16/2018  6:00 AM   Dressing Care, Wound other (see comments) 10/15/2018  2:20 PM             OT Rehab Goals     Row Name 10/16/18 0859             Transfer Goal 1 (OT)    Progress/Outcome (Transfer Goal 1, OT) goal met  -AR         Dressing Goal 1 (OT)    Progress/Outcome (Dressing Goal 1, OT) goal not met  -AR         ROM Goal 1 (OT)    Progress/Outcome (ROM Goal 1, OT) goal met  -AR         Problem Specific Goal 1 (OT)    Progress/Outcome (Problem Specific Goal 1, OT) goal met  -AR        User Key  (r) = Recorded By, (t) = Taken By, (c) = Cosigned By    Initials Name Provider Type Discipline    AR Zulema Blossom Grazyna, OT Occupational Therapist OT          Occupational Therapy Education     Title: PT OT SLP Therapies (Done)     Topic: Occupational Therapy (Done)     Point: ADL training (Done)     Description: Instruct learner(s) on proper safety adaptation and remediation techniques during self care or transfers.   Instruct in proper use of assistive devices.   Learning Progress Summary     Learner Status Readiness Method Response Comment Documented by    Patient Done CHINTAN Orantes D,H AUGUSTO,DU  AR 10/16/18 0859     Done CHINTAN Orantes D,H VU,NR  AR 10/15/18 1611    Family Done CHINTAN Orantes D,H VU,NR  AR 10/15/18 1611          Point: Home exercise program (Done)     Description: Instruct learner(s) on appropriate technique for  monitoring, assisting and/or progressing therapeutic exercises/activities.   Learning Progress Summary     Learner Status Readiness Method Response Comment Documented by    Patient Done Eager E,TB,D,H VU,DU  AR 10/16/18 0859     Done Eager E,TB,D,H VU,NR  AR 10/15/18 1611    Family Done Eager E,TB,D,H VU,NR  AR 10/15/18 1611          Point: Precautions (Done)     Description: Instruct learner(s) on prescribed precautions during self-care and functional transfers.   Learning Progress Summary     Learner Status Readiness Method Response Comment Documented by    Patient Done Eager E,TB,D,H VU,DU  AR 10/16/18 0859     Done Eager E,TB,D,H VU,NR  AR 10/15/18 1611    Family Done Eager E,TB,D,H VU,NR  AR 10/15/18 1611          Point: Body mechanics (Done)     Description: Instruct learner(s) on proper positioning and spine alignment during self-care, functional mobility activities and/or exercises.   Learning Progress Summary     Learner Status Readiness Method Response Comment Documented by    Patient Done Eager E,TB,D,H VU,DU  AR 10/16/18 0859     Done Eager E,TB,D,H VU,NR  AR 10/15/18 1611    Family Done Eager E,TB,D,H VU,NR  AR 10/15/18 1611                      User Key     Initials Effective Dates Name Provider Type Discipline    AR 06/22/15 -  Blossom Poole, OT Occupational Therapist OT                OT Recommendation and Plan  Outcome Summary/Treatment Plan (OT)  Daily Summary of Progress (OT): progress toward functional goals as expected, prepare for discharge  Anticipated Discharge Disposition (OT): home with assist  Reason for Discharge (OT Discharge Summary): patient discharged from this facility  Therapy Frequency (OT Eval): daily  Daily Summary of Progress (OT): progress toward functional goals as expected, prepare for discharge  Plan of Care Review  Plan of Care Reviewed With: patient, spouse, son  Plan of Care Reviewed With: patient, spouse, son  Outcome Summary: Spouse and son at bedside for  teaching. Interscalene running at 6, post pain 5/10 left axilla and nurse notified of pt's request for pain medication. Pt tolerated left shoulder PROM , IR chest/ER 25 with good teachback from spouse. Pt states he feels comfortable with planned DC home. Recommend DC home with family assist.           Outcome Measures     Row Name 10/16/18 0859 10/15/18 1611          How much help from another is currently needed...    Putting on and taking off regular lower body clothing? 2  -AR 2  -AR     Bathing (including washing, rinsing, and drying) 2  -AR 2  -AR     Toileting (which includes using toilet bed pan or urinal) 2  -AR 2  -AR     Putting on and taking off regular upper body clothing 2  -AR 2  -AR     Taking care of personal grooming (such as brushing teeth) 3  -AR 3  -AR     Eating meals 3  -AR 3  -AR     Score 14  -AR 14  -AR        Functional Assessment    Outcome Measure Options AM-PAC 6 Clicks Daily Activity (OT)  -AR AM-PAC 6 Clicks Daily Activity (OT)  -AR       User Key  (r) = Recorded By, (t) = Taken By, (c) = Cosigned By    Initials Name Provider Type    Blossom Donovan, OT Occupational Therapist           Time Calculation:          Time Calculation- OT     Row Name 10/16/18 0859             Time Calculation- OT    OT Start Time 0859  -AR      Total Timed Code Minutes- OT 68 minute(s)  -AR      OT Received On 10/16/18  -AR      OT Goal Re-Cert Due Date 10/25/18  -AR         Timed Charges    52597 - OT Therapeutic Exercise Minutes 25  -AR      66002 - OT Self Care/Mgmt Minutes 43  -AR        User Key  (r) = Recorded By, (t) = Taken By, (c) = Cosigned By    Initials Name Provider Type    Blossom Donovan, OT Occupational Therapist        Therapy Suggested Charges     Code   Minutes Charges    92061 (CPT®) Hc Ot Neuromusc Re Education Ea 15 Min      04195 (CPT®) Hc Ot Ther Proc Ea 15 Min 25 2    06970 (CPT®) Hc Ot Therapeutic Act Ea 15 Min      99542 (CPT®) Hc Ot Manual Therapy Ea 15 Min       31535 (CPT®) Hc Ot Iontophoresis Ea 15 Min      33839 (CPT®) Hc Ot Elec Stim Ea-Per 15 Min      81324 (CPT®) Hc Ot Ultrasound Ea 15 Min      29029 (CPT®) Hc Ot Self Care/Mgmt/Train Ea 15 Min 43 3    Total  68 5        Therapy Charges for Today     Code Description Service Date Service Provider Modifiers Qty    80789489327 HC OT SELFCARE CURRENT 10/15/2018 Blossom Poole, OT GO, CK 1    84060803448 HC OT SELFCARE PROJECTED 10/15/2018 Blossom Poole, OT GO, CJ 1    23737410004 HC OT THER PROC EA 15 MIN 10/15/2018 Blossom Poole Grazyna, OT GO 2    13018176937 HC OT SELF CARE/MGMT/TRAIN EA 15 MIN 10/15/2018 Blossom Poole, OT GO 2    70818756954 HC OT EVAL MOD COMPLEXITY 4 10/15/2018 Blossom Poole, OT GO 1    52894671466 HC OT THER SUPP EA 15 MIN 10/15/2018 Blossom Poole, OT GO 3    29729881498 HC OT SELFCARE CURRENT 10/16/2018 Blossom Poole Grazyna, OT GO, CK 1    70428019329 HC OT SELFCARE PROJECTED 10/16/2018 Blossom Poole, OT GO, CJ 1    58851044774 HC OT SELFCARE DISCHARGE 10/16/2018 Blossom Poole, OT GO, CK 1    20957568371 HC OT THER PROC EA 15 MIN 10/16/2018 Blossom Poole, OT GO 2    84432325083 HC OT SELF CARE/MGMT/TRAIN EA 15 MIN 10/16/2018 Blossom Poole, OT GO 3          OT G-codes  OT Professional Judgement Used?: Yes  OT Functional Scales Options: AM-PAC 6 Clicks Daily Activity (OT)  Functional Assessment Tool Used: 6 clicks  Score: 14  Functional Limitation: Self care  Self Care Current Status (): At least 40 percent but less than 60 percent impaired, limited or restricted  Self Care Goal Status (): At least 20 percent but less than 40 percent impaired, limited or restricted  Self Care Discharge Status (): At least 40 percent but less than 60 percent impaired, limited or restricted    OT Discharge Summary  Anticipated Discharge Disposition (OT): home with assist  Reason for Discharge: Discharge from facility  Outcomes Achieved: Patient able  to partially acheive established goals  Discharge Destination: Home with assist    Blossom Poole, OT  10/16/2018

## 2018-10-16 NOTE — PROGRESS NOTES
Discharge Planning Assessment  [facility]     Patient Name: Levi Cain  MRN: [id number]  Today's Date: 10/16/2018    Admit Date: 10/15/2018          Discharge Needs Assessment     Row Name 10/16/18 1114       Living Environment    Lives With spouse    Current Living Arrangements home/apartment/condo    Primary Care Provided by self    Provides Primary Care For no one    Family Caregiver if Needed spouse    Able to Return to Prior Arrangements yes       Transition Planning    Patient/Family Anticipates Transition to home    Transportation Anticipated family or friend will provide       Discharge Needs Assessment    Equipment Currently Used at Home cane, straight;shower chair            Discharge Plan     Row Name 10/16/18 1115       Plan    Plan Home    Patient/Family in Agreement with Plan yes    Plan Comments Lives in [place] w/ his wife. PTA he was independent with ADL's and used a straight cane for assist with mobility when needed. No needs identified. Plan is home. CM following.     Final Discharge Disposition Code 01 - home or self-care        Destination     No service coordination in this encounter.      Durable Medical Equipment     No service coordination in this encounter.      Dialysis/Infusion     No service coordination in this encounter.      Home Medical Care     No service coordination in this encounter.      Social Care     No service coordination in this encounter.        Expected Discharge Date and Time     Expected Discharge Date Expected Discharge Time    Oct 16, 2018               Demographic Summary     Row Name 10/16/18 1114       General Information    Arrived From home    Reason for Consult discharge planning            Functional Status     Row Name 10/16/18 1114       Functional Status    Usual Activity Tolerance moderate    Current Activity Tolerance moderate            Psychosocial    No documentation.           Abuse/Neglect    No documentation.           Legal    No  documentation.           Substance Abuse    No documentation.           Patient Forms    No documentation.         Saritha Ureña RN

## 2018-10-16 NOTE — NURSING NOTE
Acute Pain Service:  Peripheral nerve catheter and disposable infusion device teaching completed with patient.  Verbal discussion and demonstration, handout and bracelet provided with [facility] on call central phone number.  Instructed to call with any questions or concerns.  Patient verbalized understanding.  Service will continue to follow until catheter DC'd.  Please contact patient at [phone] if needed.

## 2018-10-16 NOTE — PLAN OF CARE
Problem: Patient Care Overview  Goal: Plan of Care Review  Outcome: Outcome(s) achieved Date Met: 10/16/18   10/16/18 0802   Coping/Psychosocial   Plan of Care Reviewed With patient;spouse;son   OTHER   Outcome Summary Spouse and son at bedside for teaching. Interscalene running at 6, post pain 5/10 left axilla and nurse notified of pt's request for pain medication. Pt tolerated left shoulder PROM , IR chest/ER 25 with good teachback from spouse. Pt states he feels comfortable with planned DC home. Recommend DC home with family assist.    Coping/Psychosocial   Patient Agreement with Plan of Care agrees   Plan of Care Review   Progress improving       Problem: Shoulder Arthroplasty (Adult)  Goal: Signs and Symptoms of Listed Potential Problems Will be Absent, Minimized or Managed (Shoulder Arthroplasty)  Outcome: Ongoing (interventions implemented as appropriate)   10/16/18 0845   Goal/Outcome Evaluation   Problems Assessed (Shoulder Arthro) functional deficit   Problems Present (Shoulder Arthro) functional deficit

## 2018-10-16 NOTE — PLAN OF CARE
Problem: Patient Care Overview  Goal: Plan of Care Review  Outcome: Ongoing (interventions implemented as appropriate)   10/16/18 8862   Coping/Psychosocial   Plan of Care Reviewed With patient   OTHER   Outcome Summary Pt admited with a left shoulder, VSS, ropivicane at 6 ml/hr, voids well, will continue to monitor.    Plan of Care Review   Progress no change       Problem: Shoulder Arthroplasty (Adult)  Goal: Signs and Symptoms of Listed Potential Problems Will be Absent, Minimized or Managed (Shoulder Arthroplasty)  Outcome: Ongoing (interventions implemented as appropriate)

## 2018-10-16 NOTE — PROGRESS NOTES
Orthopedic Daily Progress Note      CC: POD1 after L reverse shoulder arthroplasty. No issues overnight. Seen by PT postop with recommendation for home today    Pain well controlled by OnQ pump   General: no fevers, chills  Abdomen: No nausea, vomiting, or diarrhea    No other complaints    Physical Exam:  I have reviewed the vital signs.  Temp:  [96.3 °F (35.7 °C)-98.3 °F (36.8 °C)] 97.4 °F (36.3 °C)  Heart Rate:  [69-80] 70  Resp:  [16-18] 18  BP: ()/(54-99) 108/64    Objective  General Appearance:    Alert, cooperative, no distress  Extremities: No clubbing, cyanosis, or edema to lower extremities  Pulses:  2+ in distal surgical extremity  Skin: Dressing Clean/dry/intact      Results Review:    I have reviewed the labs, radiology results and diagnostic studies: Lab results stable this am. Postop imaging reviewed and shows stable, appropriately placed shoulder implants.      Results from last 7 days  Lab Units 10/16/18  0643   WBC 10*3/mm3 9.47   HEMOGLOBIN g/dL 12.0*   PLATELETS 10*3/mm3 128*       Results from last 7 days  Lab Units 10/16/18  0643   SODIUM mmol/L 137   POTASSIUM mmol/L 3.7   CO2 mmol/L 29.0   CREATININE mg/dL 0.98   GLUCOSE mg/dL 128*       I have reviewed the medications.    Assessment/Problem List  POD# 1 S/p left reverse shoulder arthroplasty    Plan  - OnQ pump transition to PO pain control at home  - PT/OT to reevaluate this am prior to d/c  - DVT ppx: SCD pumps  - aquacell dressing stays on until 1 week, okay to shower with dressing on  - follow up: 1 week @ [facility] clinic for wound check      Discharge Planning: I expect patient to be discharged to home today.    Kwaku Jc MD  10/16/18  7:55 AM

## 2018-10-16 NOTE — DISCHARGE SUMMARY
Patient Name: Levi Cain  MRN: [id number]  : 1950  DOS: 10/16/2018    Attending: No att. providers found    Primary Care Provider: John Meyer MD    Date of Admission:.10/15/2018  7:24 AM    Date of Discharge:  10/16/2018    Discharge Diagnosis:   Status post reverse total replacement of left shoulder    HLD (hyperlipidemia)    HTN (hypertension)    Pacemaker    History of stroke    Prediabetes    Acute blood loss anemia, mild, asymptomatic    Acute postoperative pain      Hospital Course  Patient is a 68 y.o. male presented for Left reverse total shoulder arthroplasty by Dr. Amador under [place]. He tolerated surgery well and was admitted for further medical management. His shoulder has been painful with limited ROM for several years. He has had previous right shoulder replacement.     He has history of CVA. He takes Plavix daily.    Patient was provided pain medications as needed for pain control, along with interscalene nerve block infusion of Ropivacaine.    Adjustments were made to pain medications to optimize postop pain management. Risks and benefits of opiate medications discussed with patient.    The patient was seen by OT and was taught exercises for his left arm.  The patient used an IS for atelectasis prophylaxis and mechanicals for DVT prophylaxis.  Home medications were resumed as appropriate, and labs were monitored and remained fairly stable.     With the progress he has made, he is ready for DC home today.    The patient will have an On Q pump ( instructed on it during this admit)  Discussed with patient regarding plan and he shows understanding and agreement.        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 Primary localized osteoarthrosis of shoulder, left [M19.012]  2. Biceps tenosynovitis.    PROCEDURES PERFORMED:  1. Left reverse total shoulder arthroplasty.  "   2. Left biceps tenodesis.    SURGEON: Lloyd Amador MD       Pertinent Test Results:    I reviewed the patient's new clinical results.     Results from last 7 days  Lab Units 10/16/18  0643   WBC 10*3/mm3 9.47   HEMOGLOBIN g/dL 12.0*   HEMATOCRIT % 35.8*   PLATELETS 10*3/mm3 128*     Results for SALOME WILLS (MRN [id number]) as of 10/16/2018 15:41   Ref. Range 10/8/2018 14:09   Hemoglobin Latest Ref Range: 13.1 - 17.5 g/dL 13.3   Hematocrit Latest Ref Range: 38.9 - 50.9 % 40.1       Results from last 7 days  Lab Units 10/16/18  0643   SODIUM mmol/L 137   POTASSIUM mmol/L 3.7   CHLORIDE mmol/L 103   CO2 mmol/L 29.0   BUN mg/dL 13   CREATININE mg/dL 0.98   CALCIUM mg/dL 8.3*   GLUCOSE mg/dL 128*     Results for SALOME WILLS (MRN [id number]) as of 10/16/2018 15:41   Ref. Range 10/15/2018 15:34 10/15/2018 20:40 10/16/2018 06:43 10/16/2018 07:35   Glucose Latest Ref Range: 70 - 130 mg/dL 122 153 (H) 128 (H) 133 (H)     I reviewed the patient's new imaging including images and reports.      Occupational therapy: Spouse and son at bedside for teaching. Interscalene running at 6, post pain 5/10 left axilla and nurse notified of pt's request for pain medication. Pt tolerated left shoulder PROM , IR chest/ER 25 with good teachback from spouse. Pt states he feels comfortable with planned DC home. Recommend DC home with family assist.    Discharge Assessment:    Vital Signs  /64 (BP Location: Right arm, Patient Position: Sitting)   Pulse 70   Temp 97.4 °F (36.3 °C) (Oral)   Resp 18   Ht 175.3 cm (69\")   Wt 85.3 kg (188 lb)   SpO2 98%   BMI 27.76 kg/m²   Temp (24hrs), Av.9 °F (36.6 °C), Min:97.4 °F (36.3 °C), Max:98.1 °F (36.7 °C)      General Appearance:    Alert, cooperative, in no acute distress   Lungs:     Clear to auscultation,respirations regular, even and                   unlabored    Heart:    Regular rhythm and normal rate, normal S1 and S2   Abdomen:     Normal bowel sounds, no " masses, no organomegaly, soft        non-tender, non-distended, no guarding, no rebound                 tenderness   Extremities:   LUE sling, nerve block present. Aquacel CDI. Good cap refill and movement left digits.   Pulses:   Pulses palpable and equal bilaterally   Skin:   No bleeding, bruising or rash   Neurologic:   Cranial nerves 2 - 12 grossly intact, sensation intact       Discharge Disposition: Home    Discharge Medications     Discharge Medications      New Medications      Instructions Start Date   docusate sodium 100 MG capsule   100 mg, Oral, 2 Times Daily PRN      HYDROcodone-acetaminophen  MG per tablet  Commonly known as:  NORCO   1 tablet, Oral, Every 4 Hours PRN         Continue These Medications      Instructions Start Date   atorvastatin 10 MG tablet  Commonly known as:  LIPITOR   10 mg, Oral, Nightly      clopidogrel 75 MG tablet  Commonly known as:  PLAVIX   75 mg, Oral, Nightly, Will stop for surgery per Dr Amador's office       coenzyme Q10 100 MG capsule   100 mg, Oral, Nightly      meloxicam 15 MG tablet  Commonly known as:  MOBIC   15 mg, Oral, Nightly, Stopped for surgery       metoprolol succinate XL 50 MG 24 hr tablet  Commonly known as:  TOPROL-XL   50 mg, Oral, Nightly      MULTIVITAMIN ADULT PO   1 tablet, Oral, Nightly      pantoprazole 20 MG EC tablet  Commonly known as:  PROTONIX   20 mg, Oral, Nightly      sertraline 50 MG tablet  Commonly known as:  ZOLOFT   50 mg, Oral, Nightly      vitamin E 100 UNIT capsule   100 Units, Oral, Nightly             Discharge Diet: Consistent carb diet    Activity at Discharge: LUE sling, pendulum exercises    Follow-up Appointments  Dr. Amador per his orders    Discharge took over 30 min    EMILY Ray  10/16/18  3:41 PM

## 2018-10-16 NOTE — PLAN OF CARE
Problem: Patient Care Overview  Goal: Plan of Care Review  Outcome: Ongoing (interventions implemented as appropriate)   10/15/18 2010   Coping/Psychosocial   Plan of Care Reviewed With patient   OTHER   Outcome Summary patient arrived at aprox 1400. Patient drowsy family stated pt usually takes a couple of hours to return to baseline as he has shown this with previous procedures requiring anesthesia . Patient complaine of left shoulder pain nerve block increased to 10ml/hr. Turned down at 1711 to 6ml/hr after pt worked with PT. PT has signed off and OT will evaluate tomorrow. BG monitored no insulin coverage needed w/ dinner. Dressing clean dry and intact. SCDS in place . VSS. Was able to void towards end of shift. Will continue to monitor.    Coping/Psychosocial   Patient Agreement with Plan of Care agrees   Plan of Care Review   Progress improving       Problem: Shoulder Arthroplasty (Adult)  Goal: Signs and Symptoms of Listed Potential Problems Will be Absent, Minimized or Managed (Shoulder Arthroplasty)  Outcome: Ongoing (interventions implemented as appropriate)   10/15/18 2010   Goal/Outcome Evaluation   Problems Assessed (Shoulder Arthro) all   Problems Present (Shoulder Arthro) pain     Goal: Anesthesia/Sedation Recovery  Outcome: Ongoing (interventions implemented as appropriate)   10/15/18 2010   Goal/Outcome Evaluation   Anesthesia/Sedation Recovery progressing toward baseline

## 2018-10-18 NOTE — PROGRESS NOTES
SAIDA Mitchell    Nerve Cath Post Op Call    Patient Name: Levi Cain  :  1950  MRN:  [id number]  Date of Discharge: 10/16/2018    Nerve Cath Post Op Call:    Analgesia:Excellent  Pain Score:0/10  Side Effects:None  Catheter Site:clean  Patient Controlled ON Q pump infusion rate: 6ml/hr  Catheter Plan:Will continue with plan at home without changes and The patient was instructed to call ON CALL Anesthesia provider for any questions or problems

## 2018-10-19 NOTE — PROGRESS NOTES
SAIDA Mitchell    Nerve Cath Post Op Call    Patient Name: Levi Cain  :  1950  MRN:  [id number]  Date of Discharge: 10/16/2018    Nerve Cath Post Op Call:    Catheter Plan:Patient/Family member report nerve catheter previously discontinued, tip intact      REMOVED THIS MORNING AND HAPPY WITH PAIN SERVICE

## 2023-03-17 ENCOUNTER — TRANSCRIBE ORDERS (OUTPATIENT)
Dept: ADMINISTRATIVE | Facility: HOSPITAL | Age: 73
End: 2023-03-17
Payer: MEDICARE

## 2023-03-17 DIAGNOSIS — Z96.611 HISTORY OF TOTAL REPLACEMENT OF RIGHT SHOULDER JOINT: Primary | ICD-10-CM

## 2023-03-20 ENCOUNTER — TRANSCRIBE ORDERS (OUTPATIENT)
Dept: ADMINISTRATIVE | Facility: HOSPITAL | Age: 73
End: 2023-03-20
Payer: MEDICARE

## 2023-03-20 DIAGNOSIS — Z96.619 LOOSENING OF TOTAL SHOULDER REPLACEMENT, INITIAL ENCOUNTER: Primary | ICD-10-CM

## 2023-03-20 DIAGNOSIS — T84.038A LOOSENING OF TOTAL SHOULDER REPLACEMENT, INITIAL ENCOUNTER: Primary | ICD-10-CM

## 2023-05-12 ENCOUNTER — HOSPITAL ENCOUNTER (OUTPATIENT)
Dept: GENERAL RADIOLOGY | Facility: HOSPITAL | Age: 73
Discharge: HOME | End: 2023-05-12
Payer: MEDICARE

## 2023-05-12 ENCOUNTER — HOSPITAL ENCOUNTER (OUTPATIENT)
Dept: CT IMAGING | Facility: HOSPITAL | Age: 73
Discharge: HOME | End: 2023-05-12
Payer: MEDICARE

## 2023-05-12 DIAGNOSIS — Z96.619 LOOSENING OF TOTAL SHOULDER REPLACEMENT, INITIAL ENCOUNTER: ICD-10-CM

## 2023-05-12 DIAGNOSIS — Z96.611 HISTORY OF TOTAL REPLACEMENT OF RIGHT SHOULDER JOINT: ICD-10-CM

## 2023-05-12 DIAGNOSIS — T84.038A LOOSENING OF TOTAL SHOULDER REPLACEMENT, INITIAL ENCOUNTER: ICD-10-CM

## 2023-05-12 PROCEDURE — 0 LIDOCAINE 1 % SOLUTION: Performed by: ORTHOPAEDIC SURGERY

## 2023-05-12 PROCEDURE — 77002 NEEDLE LOCALIZATION BY XRAY: CPT

## 2023-05-12 PROCEDURE — 73200 CT UPPER EXTREMITY W/O DYE: CPT

## 2023-05-12 RX ORDER — LIDOCAINE HYDROCHLORIDE 10 MG/ML
5 INJECTION, SOLUTION INFILTRATION; PERINEURAL ONCE
Status: COMPLETED | OUTPATIENT
Start: 2023-05-12 | End: 2023-05-12

## 2023-05-12 RX ADMIN — LIDOCAINE HYDROCHLORIDE 5 ML: 10 INJECTION, SOLUTION INFILTRATION; PERINEURAL at 13:19

## 2024-07-26 NOTE — NURSING NOTE
Discharge reviewed questions answered and awaiting transport   pt c/o generalized weakness and vomiting/weakness

## (undated) DEVICE — DRAPE,SHOULDER,BEACH CHAIR,STERILE: Brand: MEDLINE

## (undated) DEVICE — SUT VIC 0 TIES 18IN J912G

## (undated) DEVICE — CVR HNDL LT SURG ACCSSRY BLU STRL

## (undated) DEVICE — SST TWIST DRILL, STANDARD, 2.4MM DIA. X 127MM: Brand: MICROAIRE®

## (undated) DEVICE — 3 BONE CEMENT MIXER: Brand: MIXEVAC

## (undated) DEVICE — DRSNG SURG AQUACEL AG 9X25CM

## (undated) DEVICE — NERVE BLOCK SUPPORT KIT/BLUE: Brand: MEDLINE INDUSTRIES, INC.

## (undated) DEVICE — T4 PULLOVER TOGA, X-LARGE

## (undated) DEVICE — AIRWY 90MM NO9

## (undated) DEVICE — GLV SURG TRIUMPH CLASSIC PF LTX 7.5 STRL

## (undated) DEVICE — GLV SURG SIGNATURE TOUCH PF LTX 8 STRL BX/50

## (undated) DEVICE — CANNULA,OXY,ADULT,SUPERSOFT,W/7'TUB,UC: Brand: MEDLINE

## (undated) DEVICE — STERILE PVP: Brand: MEDLINE INDUSTRIES, INC.

## (undated) DEVICE — COVER,MAYO STAND,STERILE: Brand: MEDLINE

## (undated) DEVICE — BIT DRL EQUINOXE 2 AND 3.2MM

## (undated) DEVICE — PUMP PAIN AUTOFUSER AUTO SELCT NOBOLUS 1TO14ML/HR 550ML DISP

## (undated) DEVICE — SYR CATH/TIP 50ML 2OZ STRL 1P/U

## (undated) DEVICE — ANTIBACTERIAL UNDYED BRAIDED (POLYGLACTIN 910), SYNTHETIC ABSORBABLE SUTURE: Brand: COATED VICRYL

## (undated) DEVICE — SKIN AFFIX SURG ADHESIVE 72/CS 0.55ML: Brand: MEDLINE

## (undated) DEVICE — PK MAJ SHLDR SPLT 10

## (undated) DEVICE — ST PRIM GRVTY NDLESS 3 INJ PORT 105IN

## (undated) DEVICE — POSTN HD UNIV

## (undated) DEVICE — ST NERV BLCK CONT CONTIPLEX ECHO CLSD 18G 4IN

## (undated) DEVICE — MEDI-VAC YANKAUER SUCTION HANDLE W/BULBOUS TIP: Brand: CARDINAL HEALTH

## (undated) DEVICE — MEDI-VAC NON-CONDUCTIVE SUCTION TUBING: Brand: CARDINAL HEALTH

## (undated) DEVICE — T4 PULLOVER TOGA, LARGE

## (undated) DEVICE — STRYKER PERFORMANCE SERIES SAGITTAL BLADE: Brand: STRYKER PERFORMANCE SERIES

## (undated) DEVICE — SUT MONOCRYL PLS ANTIB UND 3/0  PS1 27IN